# Patient Record
Sex: FEMALE | Race: WHITE | NOT HISPANIC OR LATINO
[De-identification: names, ages, dates, MRNs, and addresses within clinical notes are randomized per-mention and may not be internally consistent; named-entity substitution may affect disease eponyms.]

---

## 2017-04-27 ENCOUNTER — RESULT REVIEW (OUTPATIENT)
Age: 57
End: 2017-04-27

## 2017-06-09 ENCOUNTER — OUTPATIENT (OUTPATIENT)
Dept: OUTPATIENT SERVICES | Facility: HOSPITAL | Age: 57
LOS: 1 days | End: 2017-06-09
Payer: COMMERCIAL

## 2017-06-09 ENCOUNTER — APPOINTMENT (OUTPATIENT)
Dept: MAMMOGRAPHY | Facility: CLINIC | Age: 57
End: 2017-06-09

## 2017-06-09 ENCOUNTER — APPOINTMENT (OUTPATIENT)
Dept: ULTRASOUND IMAGING | Facility: CLINIC | Age: 57
End: 2017-06-09

## 2017-06-09 DIAGNOSIS — Z00.8 ENCOUNTER FOR OTHER GENERAL EXAMINATION: ICD-10-CM

## 2017-06-09 PROCEDURE — 76641 ULTRASOUND BREAST COMPLETE: CPT

## 2017-06-09 PROCEDURE — 77063 BREAST TOMOSYNTHESIS BI: CPT

## 2017-06-09 PROCEDURE — 77067 SCR MAMMO BI INCL CAD: CPT

## 2017-08-16 ENCOUNTER — RESULT REVIEW (OUTPATIENT)
Age: 57
End: 2017-08-16

## 2018-05-29 ENCOUNTER — EMERGENCY (EMERGENCY)
Facility: HOSPITAL | Age: 58
LOS: 0 days | Discharge: HOME | End: 2018-05-29
Attending: EMERGENCY MEDICINE | Admitting: EMERGENCY MEDICINE
Payer: COMMERCIAL

## 2018-05-29 VITALS
HEART RATE: 99 BPM | WEIGHT: 160.06 LBS | OXYGEN SATURATION: 100 % | DIASTOLIC BLOOD PRESSURE: 84 MMHG | SYSTOLIC BLOOD PRESSURE: 137 MMHG | RESPIRATION RATE: 20 BRPM | TEMPERATURE: 98 F
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
## 2018-05-29 DIAGNOSIS — M54.2 CERVICALGIA: ICD-10-CM

## 2018-05-29 DIAGNOSIS — S29.012A STRAIN OF MUSCLE AND TENDON OF BACK WALL OF THORAX, INITIAL ENCOUNTER: ICD-10-CM
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
## 2018-05-29 PROCEDURE — 99283 EMERGENCY DEPT VISIT LOW MDM: CPT

## 2018-05-29 PROCEDURE — 93010 ELECTROCARDIOGRAM REPORT: CPT

## 2018-05-29 RX ORDER — IBUPROFEN 200 MG
1 TABLET ORAL
Qty: 20 | Refills: 0 | OUTPATIENT
Start: 2018-05-29 | End: 2018-06-02

## 2018-05-29 RX ORDER — CYCLOBENZAPRINE HYDROCHLORIDE 10 MG/1
10 TABLET, FILM COATED ORAL ONCE
Qty: 0 | Refills: 0 | Status: COMPLETED | OUTPATIENT
Start: 2018-05-29 | End: 2018-05-29

## 2018-05-29 RX ORDER — CYCLOBENZAPRINE HYDROCHLORIDE 10 MG/1
1 TABLET, FILM COATED ORAL
Qty: 21 | Refills: 0 | OUTPATIENT
Start: 2018-05-29 | End: 2018-06-04

## 2018-05-29 RX ORDER — IBUPROFEN 200 MG
400 TABLET ORAL ONCE
Qty: 0 | Refills: 0 | Status: COMPLETED | OUTPATIENT
Start: 2018-05-29 | End: 2018-05-29

## 2018-05-29 RX ADMIN — CYCLOBENZAPRINE HYDROCHLORIDE 10 MILLIGRAM(S): 10 TABLET, FILM COATED ORAL at 19:27

## 2018-05-29 RX ADMIN — Medication 400 MILLIGRAM(S): at 19:27

## 2018-05-29 NOTE — ED ADULT TRIAGE NOTE - CHIEF COMPLAINT QUOTE
pt c/o upper back pain, left arm and neck pain since yesterday, pt states she woke up with the discomfort, denies fall trauma, fever or heavy lifting.

## 2018-05-29 NOTE — ED STATDOCS - PROGRESS NOTE DETAILS
57 yr. old female PMH: presents to ED with  left upper back and neck pain onset yesterday. No fever or chills. No recent trauma. Seen and examined by attending in Intake. Plan: Flexeril and Ibuprofen. Will F/u with results and re evaluate. Froy HANSON

## 2018-05-29 NOTE — ED STATDOCS - OBJECTIVE STATEMENT
56 y/o F presents to ED c/o upper back pain, left arm and neck pain since yesterday, pt states she woke up with the discomfort. Denies fall trauma, fever or heavy lifting.

## 2018-05-29 NOTE — ED STATDOCS - MEDICAL DECISION MAKING DETAILS
Paraspinal muscular tenderness only.  No midline TTP.  No step off or deformity.  Normal ROM of the neck. Neurologic exam normal, no focal deficits.  No concern for cervical fracture or spinal cord injury.  Likely cervical sprain.  Recommend RICE therapy.  F/u with PCP.

## 2018-05-29 NOTE — ED ADULT NURSE NOTE - OBJECTIVE STATEMENT
pt c/o upper back pain, left arm and neck pain since yesterday, pt states she woke up with the discomfort, denies trauma

## 2018-06-11 ENCOUNTER — APPOINTMENT (OUTPATIENT)
Dept: MAMMOGRAPHY | Facility: CLINIC | Age: 58
End: 2018-06-11
Payer: COMMERCIAL

## 2018-06-11 ENCOUNTER — OUTPATIENT (OUTPATIENT)
Dept: OUTPATIENT SERVICES | Facility: HOSPITAL | Age: 58
LOS: 1 days | End: 2018-06-11
Payer: COMMERCIAL

## 2018-06-11 ENCOUNTER — APPOINTMENT (OUTPATIENT)
Dept: ULTRASOUND IMAGING | Facility: CLINIC | Age: 58
End: 2018-06-11
Payer: COMMERCIAL

## 2018-06-11 DIAGNOSIS — Z00.8 ENCOUNTER FOR OTHER GENERAL EXAMINATION: ICD-10-CM

## 2018-06-11 PROCEDURE — 76641 ULTRASOUND BREAST COMPLETE: CPT | Mod: 26,50

## 2018-06-11 PROCEDURE — 76641 ULTRASOUND BREAST COMPLETE: CPT

## 2018-06-11 PROCEDURE — 77067 SCR MAMMO BI INCL CAD: CPT

## 2018-06-11 PROCEDURE — 77067 SCR MAMMO BI INCL CAD: CPT | Mod: 26

## 2018-06-11 PROCEDURE — 77063 BREAST TOMOSYNTHESIS BI: CPT

## 2018-06-11 PROCEDURE — 77063 BREAST TOMOSYNTHESIS BI: CPT | Mod: 26

## 2018-08-11 ENCOUNTER — APPOINTMENT (OUTPATIENT)
Dept: ORTHOPEDIC SURGERY | Facility: CLINIC | Age: 58
End: 2018-08-11
Payer: OTHER MISCELLANEOUS

## 2018-08-11 VITALS
HEIGHT: 60 IN | WEIGHT: 184 LBS | HEART RATE: 64 BPM | SYSTOLIC BLOOD PRESSURE: 134 MMHG | BODY MASS INDEX: 36.12 KG/M2 | DIASTOLIC BLOOD PRESSURE: 81 MMHG

## 2018-08-11 DIAGNOSIS — Z78.9 OTHER SPECIFIED HEALTH STATUS: ICD-10-CM

## 2018-08-11 DIAGNOSIS — S80.02XA CONTUSION OF LEFT KNEE, INITIAL ENCOUNTER: ICD-10-CM

## 2018-08-11 DIAGNOSIS — Z80.9 FAMILY HISTORY OF MALIGNANT NEOPLASM, UNSPECIFIED: ICD-10-CM

## 2018-08-11 PROCEDURE — 99204 OFFICE O/P NEW MOD 45 MIN: CPT

## 2018-08-11 PROCEDURE — 73562 X-RAY EXAM OF KNEE 3: CPT | Mod: LT

## 2018-08-24 ENCOUNTER — APPOINTMENT (OUTPATIENT)
Dept: MRI IMAGING | Facility: CLINIC | Age: 58
End: 2018-08-24
Payer: COMMERCIAL

## 2018-08-24 ENCOUNTER — OUTPATIENT (OUTPATIENT)
Dept: OUTPATIENT SERVICES | Facility: HOSPITAL | Age: 58
LOS: 1 days | End: 2018-08-24
Payer: COMMERCIAL

## 2018-08-24 DIAGNOSIS — Z00.8 ENCOUNTER FOR OTHER GENERAL EXAMINATION: ICD-10-CM

## 2018-08-24 PROCEDURE — 73721 MRI JNT OF LWR EXTRE W/O DYE: CPT

## 2018-08-24 PROCEDURE — 73721 MRI JNT OF LWR EXTRE W/O DYE: CPT | Mod: 26,LT

## 2018-08-31 ENCOUNTER — APPOINTMENT (OUTPATIENT)
Dept: ORTHOPEDIC SURGERY | Facility: CLINIC | Age: 58
End: 2018-08-31
Payer: OTHER MISCELLANEOUS

## 2018-08-31 DIAGNOSIS — S83.242A OTHER TEAR OF MEDIAL MENISCUS, CURRENT INJURY, LEFT KNEE, INITIAL ENCOUNTER: ICD-10-CM

## 2018-08-31 PROCEDURE — 99214 OFFICE O/P EST MOD 30 MIN: CPT

## 2018-11-16 ENCOUNTER — OUTPATIENT (OUTPATIENT)
Dept: OUTPATIENT SERVICES | Facility: HOSPITAL | Age: 58
LOS: 1 days | Discharge: HOME | End: 2018-11-16
Payer: OTHER MISCELLANEOUS

## 2018-11-16 VITALS
HEART RATE: 61 BPM | RESPIRATION RATE: 16 BRPM | TEMPERATURE: 98 F | OXYGEN SATURATION: 98 % | WEIGHT: 186.07 LBS | DIASTOLIC BLOOD PRESSURE: 90 MMHG | HEIGHT: 60 IN | SYSTOLIC BLOOD PRESSURE: 140 MMHG

## 2018-11-16 DIAGNOSIS — Z98.890 OTHER SPECIFIED POSTPROCEDURAL STATES: Chronic | ICD-10-CM

## 2018-11-16 DIAGNOSIS — S83.222D PERIPHERAL TEAR OF MEDIAL MENISCUS, CURRENT INJURY, LEFT KNEE, SUBSEQUENT ENCOUNTER: ICD-10-CM

## 2018-11-16 LAB
ANION GAP SERPL CALC-SCNC: 7 MMOL/L — SIGNIFICANT CHANGE UP (ref 5–17)
BASOPHILS # BLD AUTO: 0.04 K/UL — SIGNIFICANT CHANGE UP (ref 0–0.2)
BASOPHILS NFR BLD AUTO: 0.6 % — SIGNIFICANT CHANGE UP (ref 0–2)
BUN SERPL-MCNC: 16 MG/DL — SIGNIFICANT CHANGE UP (ref 7–23)
CALCIUM SERPL-MCNC: 8.9 MG/DL — SIGNIFICANT CHANGE UP (ref 8.5–10.1)
CHLORIDE SERPL-SCNC: 111 MMOL/L — HIGH (ref 96–108)
CO2 SERPL-SCNC: 24 MMOL/L — SIGNIFICANT CHANGE UP (ref 22–31)
CREAT SERPL-MCNC: 0.77 MG/DL — SIGNIFICANT CHANGE UP (ref 0.5–1.3)
EOSINOPHIL # BLD AUTO: 0.21 K/UL — SIGNIFICANT CHANGE UP (ref 0–0.5)
EOSINOPHIL NFR BLD AUTO: 3.2 % — SIGNIFICANT CHANGE UP (ref 0–6)
GLUCOSE SERPL-MCNC: 103 MG/DL — HIGH (ref 70–99)
HCT VFR BLD CALC: 39.7 % — SIGNIFICANT CHANGE UP (ref 34.5–45)
HGB BLD-MCNC: 13.7 G/DL — SIGNIFICANT CHANGE UP (ref 11.5–15.5)
IMM GRANULOCYTES NFR BLD AUTO: 0.3 % — SIGNIFICANT CHANGE UP (ref 0–1.5)
LYMPHOCYTES # BLD AUTO: 2.58 K/UL — SIGNIFICANT CHANGE UP (ref 1–3.3)
LYMPHOCYTES # BLD AUTO: 39.9 % — SIGNIFICANT CHANGE UP (ref 13–44)
MCHC RBC-ENTMCNC: 31 PG — SIGNIFICANT CHANGE UP (ref 27–34)
MCHC RBC-ENTMCNC: 34.5 GM/DL — SIGNIFICANT CHANGE UP (ref 32–36)
MCV RBC AUTO: 89.8 FL — SIGNIFICANT CHANGE UP (ref 80–100)
MONOCYTES # BLD AUTO: 0.56 K/UL — SIGNIFICANT CHANGE UP (ref 0–0.9)
MONOCYTES NFR BLD AUTO: 8.7 % — SIGNIFICANT CHANGE UP (ref 2–14)
NEUTROPHILS # BLD AUTO: 3.06 K/UL — SIGNIFICANT CHANGE UP (ref 1.8–7.4)
NEUTROPHILS NFR BLD AUTO: 47.3 % — SIGNIFICANT CHANGE UP (ref 43–77)
NRBC # BLD: 0 /100 WBCS — SIGNIFICANT CHANGE UP (ref 0–0)
PLATELET # BLD AUTO: 186 K/UL — SIGNIFICANT CHANGE UP (ref 150–400)
POTASSIUM SERPL-MCNC: 4.4 MMOL/L — SIGNIFICANT CHANGE UP (ref 3.5–5.3)
POTASSIUM SERPL-SCNC: 4.4 MMOL/L — SIGNIFICANT CHANGE UP (ref 3.5–5.3)
RBC # BLD: 4.42 M/UL — SIGNIFICANT CHANGE UP (ref 3.8–5.2)
RBC # FLD: 12.7 % — SIGNIFICANT CHANGE UP (ref 10.3–14.5)
SODIUM SERPL-SCNC: 142 MMOL/L — SIGNIFICANT CHANGE UP (ref 135–145)
WBC # BLD: 6.47 K/UL — SIGNIFICANT CHANGE UP (ref 3.8–10.5)
WBC # FLD AUTO: 6.47 K/UL — SIGNIFICANT CHANGE UP (ref 3.8–10.5)

## 2018-11-16 PROCEDURE — 71046 X-RAY EXAM CHEST 2 VIEWS: CPT | Mod: 26

## 2018-11-16 PROCEDURE — 93010 ELECTROCARDIOGRAM REPORT: CPT

## 2018-11-16 NOTE — H&P PST ADULT - PMH
Carpal tunnel syndrome    NAEL (obstructive sleep apnea)    PAC (premature atrial contraction)    Torn meniscus  left knee Breast cyst, left    Carpal tunnel syndrome    Indigestion    Lipoma    NAEL (obstructive sleep apnea)    PAC (premature atrial contraction)    Torn meniscus  left knee

## 2018-11-16 NOTE — H&P PST ADULT - ASSESSMENT
58 year old female presents to PST for left knee arthroscopy     Plan:  1. PST instructions given ; NPO post midnight   2. Pt instructed to take following meds with sip of water   3. EZ wash instructions given & mupirocin instructions given  4. Medical Evaluation with Dr Schwab 58 year old female presents to PST for left knee arthroscopy     Plan:  1. PST instructions given ; NPO post midnight   2. Labs EKG CXR done as per surgeon request   3. EZ wash instructions given   4. Medical Evaluation with Dr Schwab

## 2018-11-16 NOTE — H&P PST ADULT - HISTORY OF PRESENT ILLNESS
58 year old female presents to [facility] for left knee arthroscopy secondary to medial meniscus tear 58 year old female PMH of NAEL ( no CPAP), PAC; she  presents to PST for left knee arthroscopy secondary to medial meniscus tear

## 2018-11-27 ENCOUNTER — OUTPATIENT (OUTPATIENT)
Dept: OUTPATIENT SERVICES | Facility: HOSPITAL | Age: 58
LOS: 1 days | Discharge: HOME | End: 2018-11-27
Payer: OTHER MISCELLANEOUS

## 2018-11-27 ENCOUNTER — RESULT REVIEW (OUTPATIENT)
Age: 58
End: 2018-11-27

## 2018-11-27 VITALS
OXYGEN SATURATION: 99 % | HEIGHT: 60 IN | RESPIRATION RATE: 16 BRPM | WEIGHT: 186.07 LBS | HEART RATE: 65 BPM | DIASTOLIC BLOOD PRESSURE: 67 MMHG | SYSTOLIC BLOOD PRESSURE: 123 MMHG | TEMPERATURE: 98 F

## 2018-11-27 VITALS
TEMPERATURE: 98 F | OXYGEN SATURATION: 97 % | DIASTOLIC BLOOD PRESSURE: 78 MMHG | HEART RATE: 71 BPM | SYSTOLIC BLOOD PRESSURE: 148 MMHG | RESPIRATION RATE: 16 BRPM

## 2018-11-27 DIAGNOSIS — Z98.890 OTHER SPECIFIED POSTPROCEDURAL STATES: Chronic | ICD-10-CM

## 2018-11-27 PROCEDURE — 88304 TISSUE EXAM BY PATHOLOGIST: CPT | Mod: 26

## 2018-11-27 RX ORDER — OXYCODONE HYDROCHLORIDE 5 MG/1
5 TABLET ORAL ONCE
Qty: 0 | Refills: 0 | Status: DISCONTINUED | OUTPATIENT
Start: 2018-11-27 | End: 2018-11-27

## 2018-11-27 RX ORDER — ASPIRIN/CALCIUM CARB/MAGNESIUM 324 MG
1 TABLET ORAL
Qty: 14 | Refills: 0 | OUTPATIENT
Start: 2018-11-27 | End: 2018-12-10

## 2018-11-27 RX ORDER — ONDANSETRON 8 MG/1
4 TABLET, FILM COATED ORAL ONCE
Qty: 0 | Refills: 0 | Status: DISCONTINUED | OUTPATIENT
Start: 2018-11-27 | End: 2018-11-27

## 2018-11-27 RX ORDER — SODIUM CHLORIDE 9 MG/ML
1000 INJECTION INTRAMUSCULAR; INTRAVENOUS; SUBCUTANEOUS
Qty: 0 | Refills: 0 | Status: DISCONTINUED | OUTPATIENT
Start: 2018-11-27 | End: 2018-11-27

## 2018-11-27 RX ORDER — FENTANYL CITRATE 50 UG/ML
50 INJECTION INTRAVENOUS
Qty: 0 | Refills: 0 | Status: DISCONTINUED | OUTPATIENT
Start: 2018-11-27 | End: 2018-11-27

## 2018-11-27 RX ORDER — RANITIDINE HYDROCHLORIDE 150 MG/1
1 TABLET, FILM COATED ORAL
Qty: 0 | Refills: 0 | COMMUNITY

## 2018-11-27 NOTE — ASU PATIENT PROFILE, ADULT - PSH
History of carpal tunnel release  left  2008  right 2004  History of lumpectomy of left breast  x2 benign

## 2018-11-27 NOTE — ASU PATIENT PROFILE, ADULT - PMH
Breast cyst, left    Carpal tunnel syndrome    Indigestion    Lipoma    NAEL (obstructive sleep apnea)    PAC (premature atrial contraction)    Torn meniscus  left knee

## 2018-11-27 NOTE — BRIEF OPERATIVE NOTE - PROCEDURE
<<-----Click on this checkbox to enter Procedure Knee arthroscopy, left  11/27/2018    Active  [id number]

## 2018-11-27 NOTE — ASU DISCHARGE PLAN (ADULT/PEDIATRIC). - MEDICATION SUMMARY - MEDICATIONS TO TAKE
I will START or STAY ON the medications listed below when I get home from the hospital:    oxyCODONE-acetaminophen 5 mg-325 mg oral tablet  -- 1 tab(s) by mouth every 4 hours, As Needed for pain MDD:6  -- Caution federal law prohibits the transfer of this drug to any person other  than the person for whom it was prescribed.  May cause drowsiness.  Alcohol may intensify this effect.  Use care when operating dangerous machinery.  This prescription cannot be refilled.  This product contains acetaminophen.  Do not use  with any other product containing acetaminophen to prevent possible liver damage.  Using more of this medication than prescribed may cause serious breathing problems.    -- Indication: For Pain    aspirin 325 mg oral tablet  -- 1 tab(s) by mouth once a day for DVT prophylaxis  -- Take with food or milk.    -- Indication: For blood clot prevention    Zantac 75 oral tablet  -- 1 tab(s) by mouth 2 times a day, As Needed  -- Indication: For PRior home med

## 2018-11-27 NOTE — ASU DISCHARGE PLAN (ADULT/PEDIATRIC). - NOTIFY
Numbness, color, or temperature change to extremity/Pain not relieved by Medications/Fever greater than 101/Numbness, tingling/Bleeding that does not stop

## 2018-11-27 NOTE — ASU DISCHARGE PLAN (ADULT/PEDIATRIC). - SPECIAL INSTRUCTIONS
1) Ice is your friend for the next 2 weeks.  Your knee will swell over the next 48hours and you can expect pain to get a bit worse. Ice your Knee plenty, continuously if possible. Fill up a plastic bag, then wrap it in a towel or pillow case.     2) Elevate your leg above your heart with about 3 pillows when you are in bed or chair. Otherwise you should be up and about as much as possible. No sitting around. The more you move the better you will do. But you can rest too when you need it. Weight bearing as tolerated.    3) Expect some bloody drainage. This is usually  from the leftover fluid put inside you knee during surgery. It is normal. It may even soak through the gauze and ACE bandage and look bloody. Not to worry as this is mostly Saline fluid coming out of your knee leftover from surgery. Even a drop of blood can make it look very bloody. Simply place another Gauze and another ACE over it and wrap it snug but not too tight. If it bleeds through the second bandage again, you can call.    4) Remove bandage in 48hrs and place waterproof band aides. Can shower in 48 hours. No bath. Pat your incisions dry. No creams, no lotions.    5) Only reason to worry would be if pain got so severe that you cannot feel or wiggle your toes, or if your foot is cold. In this case you need to call and come to the ER. But as  long as you can feel and wiggle your toes, you should be fine.    6) Make sure you call the office to schedule a follow up appointment to be seen in 10-14 days. Your Sutures will be removed at that point.    7) A pain Rx is in the chart; fill it on your way home. OR was sent electronically to your pharmacy, pick it up on the way home.

## 2018-11-30 LAB — SURGICAL PATHOLOGY FINAL REPORT - CH: SIGNIFICANT CHANGE UP

## 2018-12-02 DIAGNOSIS — M23.222 DERANGEMENT OF POSTERIOR HORN OF MEDIAL MENISCUS DUE TO OLD TEAR OR INJURY, LEFT KNEE: ICD-10-CM

## 2018-12-02 DIAGNOSIS — M23.204 DERANGEMENT OF UNSPECIFIED MEDIAL MENISCUS DUE TO OLD TEAR OR INJURY, LEFT KNEE: ICD-10-CM

## 2018-12-02 DIAGNOSIS — I49.1 ATRIAL PREMATURE DEPOLARIZATION: ICD-10-CM

## 2018-12-02 DIAGNOSIS — G47.33 OBSTRUCTIVE SLEEP APNEA (ADULT) (PEDIATRIC): ICD-10-CM

## 2018-12-02 DIAGNOSIS — K21.9 GASTRO-ESOPHAGEAL REFLUX DISEASE WITHOUT ESOPHAGITIS: ICD-10-CM

## 2018-12-02 DIAGNOSIS — K30 FUNCTIONAL DYSPEPSIA: ICD-10-CM

## 2018-12-02 DIAGNOSIS — M94.262 CHONDROMALACIA, LEFT KNEE: ICD-10-CM

## 2019-06-10 PROBLEM — S83.209A UNSPECIFIED TEAR OF UNSPECIFIED MENISCUS, CURRENT INJURY, UNSPECIFIED KNEE, INITIAL ENCOUNTER: Chronic | Status: ACTIVE | Noted: 2018-11-16

## 2019-06-10 PROBLEM — K30 FUNCTIONAL DYSPEPSIA: Chronic | Status: ACTIVE | Noted: 2018-11-16

## 2019-06-10 PROBLEM — D17.9 BENIGN LIPOMATOUS NEOPLASM, UNSPECIFIED: Chronic | Status: ACTIVE | Noted: 2018-11-16

## 2019-06-10 PROBLEM — N60.02 SOLITARY CYST OF LEFT BREAST: Chronic | Status: ACTIVE | Noted: 2018-11-16

## 2019-06-10 PROBLEM — I49.1 ATRIAL PREMATURE DEPOLARIZATION: Chronic | Status: ACTIVE | Noted: 2018-11-16

## 2019-06-10 PROBLEM — G56.00 CARPAL TUNNEL SYNDROME, UNSPECIFIED UPPER LIMB: Chronic | Status: ACTIVE | Noted: 2018-11-16

## 2019-06-10 PROBLEM — G47.33 OBSTRUCTIVE SLEEP APNEA (ADULT) (PEDIATRIC): Chronic | Status: ACTIVE | Noted: 2018-11-16

## 2019-06-20 ENCOUNTER — APPOINTMENT (OUTPATIENT)
Dept: ULTRASOUND IMAGING | Facility: CLINIC | Age: 59
End: 2019-06-20
Payer: COMMERCIAL

## 2019-06-20 ENCOUNTER — OUTPATIENT (OUTPATIENT)
Dept: OUTPATIENT SERVICES | Facility: HOSPITAL | Age: 59
LOS: 1 days | End: 2019-06-20
Payer: COMMERCIAL

## 2019-06-20 ENCOUNTER — APPOINTMENT (OUTPATIENT)
Dept: MAMMOGRAPHY | Facility: CLINIC | Age: 59
End: 2019-06-20
Payer: COMMERCIAL

## 2019-06-20 DIAGNOSIS — Z98.890 OTHER SPECIFIED POSTPROCEDURAL STATES: Chronic | ICD-10-CM

## 2019-06-20 DIAGNOSIS — Z00.8 ENCOUNTER FOR OTHER GENERAL EXAMINATION: ICD-10-CM

## 2019-06-20 PROCEDURE — 76641 ULTRASOUND BREAST COMPLETE: CPT | Mod: 26,50

## 2019-06-20 PROCEDURE — 77063 BREAST TOMOSYNTHESIS BI: CPT | Mod: 26

## 2019-06-20 PROCEDURE — 77067 SCR MAMMO BI INCL CAD: CPT | Mod: 26

## 2019-06-20 PROCEDURE — 76641 ULTRASOUND BREAST COMPLETE: CPT

## 2019-06-20 PROCEDURE — 77067 SCR MAMMO BI INCL CAD: CPT

## 2019-06-20 PROCEDURE — 77063 BREAST TOMOSYNTHESIS BI: CPT

## 2019-07-25 ENCOUNTER — APPOINTMENT (OUTPATIENT)
Dept: GASTROENTEROLOGY | Facility: CLINIC | Age: 59
End: 2019-07-25

## 2020-03-07 ENCOUNTER — TRANSCRIPTION ENCOUNTER (OUTPATIENT)
Age: 60
End: 2020-03-07

## 2020-04-26 ENCOUNTER — MESSAGE (OUTPATIENT)
Age: 60
End: 2020-04-26

## 2020-05-03 LAB
SARS-COV-2 IGG SERPL IA-ACNC: 0.2 RATIO
SARS-COV-2 IGG SERPL QL IA: NEGATIVE

## 2020-05-29 ENCOUNTER — EMERGENCY (EMERGENCY)
Facility: HOSPITAL | Age: 60
LOS: 0 days | Discharge: HOME | End: 2020-05-29
Payer: COMMERCIAL

## 2020-05-29 VITALS
SYSTOLIC BLOOD PRESSURE: 155 MMHG | HEART RATE: 67 BPM | RESPIRATION RATE: 18 BRPM | TEMPERATURE: 97 F | DIASTOLIC BLOOD PRESSURE: 63 MMHG | OXYGEN SATURATION: 98 %

## 2020-05-29 DIAGNOSIS — Z79.82 LONG TERM (CURRENT) USE OF ASPIRIN: ICD-10-CM

## 2020-05-29 DIAGNOSIS — Z98.890 OTHER SPECIFIED POSTPROCEDURAL STATES: Chronic | ICD-10-CM

## 2020-05-29 DIAGNOSIS — R05 COUGH: ICD-10-CM

## 2020-05-29 DIAGNOSIS — Z20.828 CONTACT WITH AND (SUSPECTED) EXPOSURE TO OTHER VIRAL COMMUNICABLE DISEASES: ICD-10-CM

## 2020-05-29 DIAGNOSIS — M79.10 MYALGIA, UNSPECIFIED SITE: ICD-10-CM

## 2020-05-29 DIAGNOSIS — Z79.899 OTHER LONG TERM (CURRENT) DRUG THERAPY: ICD-10-CM

## 2020-05-29 DIAGNOSIS — R53.83 OTHER FATIGUE: ICD-10-CM

## 2020-05-29 DIAGNOSIS — R51 HEADACHE: ICD-10-CM

## 2020-05-29 DIAGNOSIS — B34.9 VIRAL INFECTION, UNSPECIFIED: ICD-10-CM

## 2020-05-29 LAB — SARS-COV-2 RNA SPEC QL NAA+PROBE: SIGNIFICANT CHANGE UP

## 2020-05-29 PROCEDURE — 99283 EMERGENCY DEPT VISIT LOW MDM: CPT

## 2020-05-29 PROCEDURE — U0003: CPT

## 2020-05-29 NOTE — ED STATDOCS - NS ED ROS FT
ROS: Constitutional- -fever, -chills.  Respiratory- +cough, -SOB  Cardiac- no chest pain, no palpitations, ENT- -rhinorrhea, no sore throat, no congestion.  Abdomen- No nausea, no vomiting, no diarrhea.  Urinary- no dysuria, no urgency, no frequency.  Skin- No rashes

## 2020-05-29 NOTE — ED ADULT TRIAGE NOTE - CHIEF COMPLAINT QUOTE
Patient presents in ED with flu-like symptoms. Patient presents in ED with flu-like symptoms.   Patient is an employee of [employer]. Patient has given verbal consent for employee health to call them with results.

## 2020-05-29 NOTE — ED STATDOCS - OBJECTIVE STATEMENT
Pt with no PMH presents to ED with cough, fatigue, body aches, HA x 3-4 days. Pt recently exposed to COVID-19. Pt is RN at [employer]. Pt here for testing.

## 2020-08-05 ENCOUNTER — APPOINTMENT (OUTPATIENT)
Dept: MAMMOGRAPHY | Facility: CLINIC | Age: 60
End: 2020-08-05
Payer: COMMERCIAL

## 2020-08-05 ENCOUNTER — OUTPATIENT (OUTPATIENT)
Dept: OUTPATIENT SERVICES | Facility: HOSPITAL | Age: 60
LOS: 1 days | End: 2020-08-05
Payer: COMMERCIAL

## 2020-08-05 ENCOUNTER — APPOINTMENT (OUTPATIENT)
Dept: ULTRASOUND IMAGING | Facility: CLINIC | Age: 60
End: 2020-08-05
Payer: COMMERCIAL

## 2020-08-05 DIAGNOSIS — Z98.890 OTHER SPECIFIED POSTPROCEDURAL STATES: Chronic | ICD-10-CM

## 2020-08-05 DIAGNOSIS — Z00.8 ENCOUNTER FOR OTHER GENERAL EXAMINATION: ICD-10-CM

## 2020-08-05 PROCEDURE — 76641 ULTRASOUND BREAST COMPLETE: CPT | Mod: 26,50

## 2020-08-05 PROCEDURE — G0279: CPT | Mod: 26

## 2020-08-05 PROCEDURE — 77066 DX MAMMO INCL CAD BI: CPT

## 2020-08-05 PROCEDURE — 76641 ULTRASOUND BREAST COMPLETE: CPT

## 2020-08-05 PROCEDURE — G0279: CPT

## 2020-08-05 PROCEDURE — 77066 DX MAMMO INCL CAD BI: CPT | Mod: 26

## 2020-09-30 ENCOUNTER — APPOINTMENT (OUTPATIENT)
Dept: NEUROLOGY | Facility: CLINIC | Age: 60
End: 2020-09-30
Payer: COMMERCIAL

## 2020-09-30 VITALS
HEIGHT: 60 IN | DIASTOLIC BLOOD PRESSURE: 70 MMHG | SYSTOLIC BLOOD PRESSURE: 114 MMHG | BODY MASS INDEX: 37.11 KG/M2 | HEART RATE: 60 BPM | TEMPERATURE: 98 F | WEIGHT: 189 LBS

## 2020-09-30 DIAGNOSIS — Z82.49 FAMILY HISTORY OF ISCHEMIC HEART DISEASE AND OTHER DISEASES OF THE CIRCULATORY SYSTEM: ICD-10-CM

## 2020-09-30 PROCEDURE — 99204 OFFICE O/P NEW MOD 45 MIN: CPT

## 2020-09-30 NOTE — DISCUSSION/SUMMARY
[FreeTextEntry1] : 60-year-old female with PMHx of mild hyperlipidemia, GERD, presents with complains of intermittent visual phenomena-kaleidoscopic vision in the right eye, right eyelid twitching, occasional dizziness and right periorbital pain and pressure.\par \par # Right blepharospasm\par \par # Migraine aura/ocular migraine\par \par # Cephalgia\par \par - I have recommended MRI of the brain\par - Labs: CRP,ESR\par - As patient is not having pain, and symptoms are only bothersome but do not impair functioning or QOL, I have recommended monitoring closely, maintaining a log, followup in 4 weeks, will decide if  low-dose prophylactic medication may be helpful

## 2020-09-30 NOTE — REASON FOR VISIT
[Consultation] : a consultation visit [FreeTextEntry1] : for evaluation of visual phenomenon, eyelid twitching

## 2020-09-30 NOTE — PHYSICAL EXAM
[General Appearance - Alert] : alert [General Appearance - In No Acute Distress] : in no acute distress [Oriented To Time, Place, And Person] : oriented to person, place, and time [Mood] : the mood was normal [Person] : oriented to person [Place] : oriented to place [Time] : oriented to time [Registration Intact] : recent registration memory intact [Concentration Intact] : normal concentrating ability [Naming Objects] : no difficulty naming common objects [Repeating Phrases] : no difficulty repeating a phrase [Fluency] : fluency intact [Comprehension] : comprehension intact [Past History] : adequate knowledge of personal past history [Cranial Nerves Optic (II)] : visual acuity intact bilaterally,  visual fields full to confrontation, pupils equal round and reactive to light [Cranial Nerves Oculomotor (III)] : extraocular motion intact [Cranial Nerves Trigeminal (V)] : facial sensation intact symmetrically [Cranial Nerves Facial (VII)] : face symmetrical [Cranial Nerves Vestibulocochlear (VIII)] : hearing was intact bilaterally [Cranial Nerves Glossopharyngeal (IX)] : tongue and palate midline [Cranial Nerves Accessory (XI - Cranial And Spinal)] : head turning and shoulder shrug symmetric [Cranial Nerves Hypoglossal (XII)] : there was no tongue deviation with protrusion [Motor Tone] : muscle tone was normal in all four extremities [Motor Strength] : muscle strength was normal in all four extremities [No Muscle Atrophy] : normal bulk in all four extremities [Sensation Tactile Decrease] : light touch was intact [Balance] : balance was intact [2+] : Patella left 2+ [1+] : Ankle jerk left 1+ [PERRL With Normal Accommodation] : pupils were equal in size, round, reactive to light, with normal accommodation [Extraocular Movements] : extraocular movements were intact [Full Visual Field] : full visual field [Hearing Threshold Finger Rub Not [place]] : hearing was normal [Neck Cervical Mass (___cm)] : no neck mass was observed [Auscultation Breath Sounds / Voice Sounds] : lungs were clear to auscultation bilaterally [Heart Rate And Rhythm] : heart rate was normal and rhythm regular [Arterial Pulses Carotid] : carotid pulses were normal with no bruits [Edema] : there was no peripheral edema [Abnormal Walk] : normal gait [] : no rash [FreeTextEntry1] : obese [Past-pointing] : there was no past-pointing [Tremor] : no tremor present [Plantar Reflex Right Only] : normal on the right [Plantar Reflex Left Only] : normal on the left

## 2020-09-30 NOTE — HISTORY OF PRESENT ILLNESS
[FreeTextEntry1] : 60-year-old right-handed female with PMHx of mild hyperlipidemia, GERD, presents today complains of intermittent visual phenomena-kaleidoscopic vision in the right eye, right eyelid twitching, occasional dizziness and right periorbital pain and pressure.\par \par Patient reports that since past few months she has been experiencing intermittent twitching in the right lower eyelid, every few days she is having kaleidoscopic vision mainly in the right eye that lasts a few minutes and resolved, most of the time it's not associated with headache, she does however report that every now and then she has right periorbital / temporal pain and pressure independent of the visual phenomena. Patient also reports mild dizziness off and on and has complaints of tinnitus right more than left, this is however not of recent onset. Patient denies double vision, speech disturbance, vertigo, nausea, vomiting, photophobia, LOC or seizure-like activity. Upon further history, patient denies history of migraines in the past, she does recall having had one episode of kaleidoscopic vision 2 years ago that lasted about 5 minutes. She denies history of migraines in the past.
yes

## 2020-09-30 NOTE — REVIEW OF SYSTEMS
[Numbness] : numbness [Dizziness] : dizziness [As Noted in HPI] : as noted in HPI [Negative] : Heme/Lymph [de-identified] : twitching  [FreeTextEntry4] : tinnitus

## 2020-09-30 NOTE — DATA REVIEWED
[No studies available for review at this time.] : No studies available for review at this time. [de-identified] : 8/12/20: CBC, CMP normal, A1c 5.6, ferritin 233, TSH 2.7, total chol 223, HDL 43, triglycer 142,

## 2020-10-03 NOTE — ED STATDOCS - CHIEF COMPLAINT
Progress Note    Patient: Lucian Boss MRN: [id number]  SSN: [id number]    YOB: 1939  Age: 80 y.o. Sex: male      Admit Date: 9/22/2020    LOS: 10 days     Subjective:     81F, H/o AECHFr and pEF with AICD, PAD, CAD s/p stent with septic shock s.t UTI/ e faecalis on levaquin, sepsis/aechf resolved. Bullous pressure lesion L lower left s/p I&D 9/24. Seen at bedside. Alert and oriented,   No cp  + sob stable, better from admit. O2 bumped to 4l but hypoxia not reported. No resp distress  Main complaint is weakness and loss of appetite. BP trend is improved today, dopamine continues  BG's not well controlled           Review of Systems   Constitutional: Positive for malaise/fatigue/anorexia  HENT: Negative.    Eyes: Negative.    Respiratory: Positive for shortness of breath   Cardiovascular: neg for palpitaions or cp  Gastrointestinal: negative for heartburn, nausea or vomiting  Genitourinary: Negative.    Musculoskeletal: Negative.    Skin: Negative.    Neurological: Negative.    Endo/Heme/Allergies: Negative.    Psychiatric/Behavioral: Negative.      Prior to Admission Medications   Prescriptions Last Dose Informant Patient Reported? Taking? JANUVIA 100 mg tablet 9/22/2020  Yes Yes   Sig: TAKE ONE TABLET BY MOUTH ONE TIME DAILY   atorvastatin (LIPITOR) 40 mg tablet 9/22/2020  Yes Yes   Sig: TAKE ONE TABLET BY MOUTH ONE TIME DAILY AT BEDTIME   cholecalciferol (VITAMIN D3) 1,000 unit tablet 9/22/2020  Yes Yes   Sig: Take  by mouth daily.    clopidogrel (PLAVIX) 75 mg tab 9/22/2020  Yes Yes   Sig: TAKE ONE TABLET BY MOUTH ONE TIME DAILY   cyanocobalamin (VITAMIN B12) 1,000 mcg/mL injection 9/22/2020  Yes Yes   Sig: INJECT 1 ML SUBCUTANEOUSLY ONCE MONTHLY   ezetimibe (ZETIA) 10 mg tablet 9/22/2020  Yes Yes   Sig: TAKE ONE TABLET BY MOUTH AT BEDTIME   ferrous sulfate 325 mg (65 mg iron) tablet 9/22/2020  Yes Yes   Sig: TAKE ONE TABLET BY MOUTH TWICE A DAY   furosemide (LASIX) 40 mg The patient is a 57y year old Female complaining of tablet 2020  Yes Yes   Sig: TAKE ONE TABLET BY MOUTH ONE TIME DAILY   insulin detemir (LEVEMIR FLEXTOUCH U-100 INSULN SC) 2020  Yes Yes   Sig: by SubCUTAneous route. Indications: as needed for BS > 150 once a day then take 10 units   levothyroxine (SYNTHROID) 88 mcg tablet 2020  Yes Yes   Sig: TAKE ONE TABLET BY MOUTH EVERY MORNING   lisinopril (PRINIVIL, ZESTRIL) 5 mg tablet 2020  Yes Yes   Si.5 mg daily   metoprolol succinate (TOPROL-XL) 25 mg XL tablet 2020  Yes Yes   Sig: TAKE ONE TABLET BY MOUTH ONE TIME DAILY   nitroglycerin (NITROSTAT) 0.4 mg SL tablet 2020  Yes Yes   Si.4 mg by SubLINGual route every five (5) minutes as needed for Chest Pain. Up to 3 doses.    pantoprazole (PROTONIX) 40 mg tablet 2020  Yes Yes   Sig: TAKE ONE TABLET BY MOUTH EVERY MORNING   ranolazine ER (RANEXA) 1,000 mg 2020  Yes Yes   Sig: TAKE ONE TABLET BY MOUTH TWICE A DAY      Facility-Administered Medications: None       Current Facility-Administered Medications:     [START ON 10/4/2020] dronabinoL (MARINOL) capsule 5 mg, 5 mg, Oral, ACB&D, Drew Mortimer, Jesus A, MD    metoprolol succinate (TOPROL-XL) XL tablet 12.5 mg, 12.5 mg, Oral, DAILY, Drew Mortimer, Jesus A, MD, 12.5 mg at 10/03/20 1029    lisinopriL (PRINIVIL, ZESTRIL) tablet 2.5 mg, 2.5 mg, Oral, DAILY, Drew Mortimer, Jesus A, MD, 2.5 mg at 10/03/20 1029    midodrine (PROAMATINE) tablet 10 mg, 10 mg, Oral, TID WITH MEALS, Drew Mortimer, Jesus A, MD, 10 mg at 10/03/20 1301    levoFLOXacin (LEVAQUIN) 750 mg in D5W IVPB, 750 mg, IntraVENous, Q48H, Bhaty, Holly Champagne MD, Last Rate: 100 mL/hr at 10/01/20 1322, 750 mg at 10/01/20 1322    heparin porcine injection 5,000 Units, 5,000 Units, SubCUTAneous, Q8H, Holly Ortiz MD, 5,000 Units at 10/03/20 1302    insulin glargine (LANTUS) injection 10 Units, 10 Units, SubCUTAneous, QHS, Eden Lewis MD, 10 Units at 10/02/20 2047    glucose chewable tablet 16 g, 4 Tab, Oral, PRN, Diana, Tiffany Reza MD    glucagon [facility]) injection 1 mg, 1 mg, IntraMUSCular, PRN, Karen Hogan MD    dextrose (D50W) injection syrg 12.5-25 g, 25-50 mL, IntraVENous, PRN, Karen Hogan MD    atorvastatin (LIPITOR) tablet 40 mg, 40 mg, Oral, QHS, Ted Yee MD, 40 mg at 10/02/20 2047    cholecalciferol (VITAMIN D3) (1000 Units /25 mcg) tablet 1 Tab, 1,000 Units, Oral, DAILY, Ted Yee MD, 1 Tab at 10/03/20 1030    clopidogreL (PLAVIX) tablet 75 mg, 75 mg, Oral, DAILY, Ted Yee MD, 75 mg at 10/03/20 1029    ezetimibe (ZETIA) tablet 10 mg, 10 mg, Oral, DAILY, Ted Yee MD, 10 mg at 10/03/20 1030    ferrous sulfate tablet 325 mg, 1 Tab, Oral, DAILY WITH BREAKFAST, Ted Yee MD, 325 mg at 10/03/20 1029    furosemide (LASIX) tablet 40 mg, 40 mg, Oral, DAILY, Ted Yee MD, 40 mg at 10/03/20 1029    SITagliptin (JANUVIA) tablet tab 50 mg, 50 mg, Oral, DAILY, Ted Yee MD, 50 mg at 10/03/20 1317    levothyroxine (SYNTHROID) tablet 88 mcg, 88 mcg, Oral, 6am, Ted Yee MD, 88 mcg at 10/03/20 0701    pantoprazole (PROTONIX) tablet 40 mg, 40 mg, Oral, ACB, Ted Yee MD, 40 mg at 10/03/20 1030    [Held by provider] ranolazine ER (RANEXA) tablet 500 mg, 500 mg, Oral, BID, Ted Yee MD, Stopped at 09/25/20 0900    insulin lispro (HUMALOG) injection, , SubCUTAneous, AC&HS, Ted Yee MD, 5 Units at 10/03/20 1301    glucose chewable tablet 16 g, 4 Tab, Oral, PRN, Ted Yee MD    dextrose (D50W) injection syrg 12.5-25 g, 25-50 mL, IntraVENous, PRN, Ted Yee MD    glucagon (GLUCAGEN) injection 1 mg, 1 mg, IntraMUSCular, PRN, Ted Yee MD    multivitamin (ONE A DAY) tablet 1 Tab, 1 Tab, Oral, DAILY, Ted Yee MD, 1 Tab at 10/03/20 1030    B complex-vitaminC-folic acid (NEPHROCAP) cap, 1 Cap, Oral, DAILY, Candido Lux, Les Wahl MD, 1 Cap at 10/03/20 1030    acetaminophen (TYLENOL) tablet 650 mg, 650 mg, Oral, Q4H PRN, Karin Batista NP, 650 mg at 10/03/20 1052    oxyCODONE IR (ROXICODONE) tablet 5 mg, 5 mg, Oral, Q6H PRN, Epifanio Guzman PA-C, 5 mg at 10/02/20 0336    Objective:     Vitals:    10/03/20 1005 10/03/20 1027 10/03/20 1342 10/03/20 1706   BP:  (!) 130/57  (!) 132/58   Pulse: 70 74  70   Resp:  18  18   Temp:  98.3 °F (36.8 °C)  97.7 °F (36.5 °C)   SpO2:  95% 95% 95%   Weight:       Height:            Intake and Output:  Current Shift: No intake/output data recorded. Last three shifts: No intake/output data recorded. Physical Exam:   General : Appearance:  no respiratory distress noted  HEENT : EOMI, normal oral mucosa, atraumatic normocephalic, Normal ear and nose. Neck : Supple, no JVD, no masses noted, no carotid bruit  Lungs : normal breath sounds.  No wheezing, no rales.  He is splinting on deep breathing. CVS : Rhythm rate regular, S1+, S2+, no murmur or gallop  Abdomen : Soft, nontender, no organomegaly, bowel sounds active  Extremities : No edema noted,  pedal pulses palpable  Musculoskeletal : Fair range of motion, no joint swelling or effusion, muscle tone and power appears normal,   Skin : Moist, warm, skin turgor, no pathological rash  Lymphatic : No cervical lymphadenopathy.   Neurological : Awake, alert, oriented to time place person.  No neurological deficits.    Psychiatric : Mood and affect appears appropriate to the situation.     Lab/Data Review:  Recent Results (from the past 24 hour(s))   GLUCOSE, POC    Collection Time: 10/02/20  8:26 PM   Result Value Ref Range    Glucose (POC) 240 (H) 65 - 100 mg/dL    Performed by Gabby Brito    GLUCOSE, POC    Collection Time: 10/03/20  7:48 AM   Result Value Ref Range    Glucose (POC) 209 (H) 65 - 100 mg/dL    Performed by ANN-MARIE LYLES    METABOLIC PANEL, BASIC    Collection Time: 10/03/20  8:12 AM   Result Value Ref Range    Sodium 133 (L) 136 - 145 mmol/L    Potassium 3.6 3.5 - 5.1 mmol/L    Chloride 98 97 - 108 mmol/L    CO2 28 21 - 32 mmol/L    Anion gap 7 5 - 15 mmol/L    Glucose 187 (H) 65 - 100 mg/dL    BUN 23 (H) 6 - 20 mg/dL    Creatinine 0.91 0.70 - 1.30 mg/dL    BUN/Creatinine ratio 25 (H) 12 - 20      GFR est AA >60 >60 ml/min/1.73m2    GFR est non-AA >60 >60 ml/min/1.73m2    Calcium 9.1 8.5 - 10.1 mg/dL   CBC WITH AUTOMATED DIFF    Collection Time: 10/03/20  8:12 AM   Result Value Ref Range    WBC 7.5 4.1 - 11.1 K/uL    RBC 3.56 (L) 4.10 - 5.70 M/uL    HGB 9.6 (L) 12.1 - 17.0 g/dL    HCT 29.0 (L) 36.6 - 50.3 %    MCV 81.5 80.0 - 99.0 FL    MCH 27.0 26.0 - 34.0 PG    MCHC 33.1 30.0 - 36.5 g/dL    RDW 16.1 (H) 11.5 - 14.5 %    PLATELET 944 (H) 849 - 400 K/uL    MPV 10.5 8.9 - 12.9 FL    NEUTROPHILS 82 (H) 32 - 75 %    LYMPHOCYTES 7 (L) 12 - 49 %    MONOCYTES 8 5 - 13 %    EOSINOPHILS 2 0 - 7 %    BASOPHILS 1 0 - 1 %    IMMATURE GRANULOCYTES 0 0.0 - 0.5 %    ABS. NEUTROPHILS 6.1 1.8 - 8.0 K/UL    ABS. LYMPHOCYTES 0.5 (L) 0.8 - 3.5 K/UL    ABS. MONOCYTES 0.6 0.0 - 1.0 K/UL    ABS. EOSINOPHILS 0.2 0.0 - 0.4 K/UL    ABS. BASOPHILS 0.1 0.0 - 0.1 K/UL    ABS. IMM. GRANS. 0.0 0.00 - 0.04 K/UL    DF AUTOMATED      RBC COMMENTS Anisocytosis  1+        RBC COMMENTS Target Cells  1+       GLUCOSE, POC    Collection Time: 10/03/20 11:08 AM   Result Value Ref Range    Glucose (POC) 255 (H) 65 - 100 mg/dL    Performed by [facility]          Assessment and plan:      --shock: likely cardiogenic and septic. Levophed and dobutamine. Resolved. Off pressor/dobutamine, s/t  E. Faecalis UTI. levaquin 750 daily from 9/27- d7/10     --UTI: ucs=E Faecalis, continue levaquin     --AECHFr and pEF, resolved, cont lasix po, cardio followed. Cont metoprolol/lisinopril     --PAD: atorvastatin, Plavix, , ezetimibe     --CYNDI on CKDIII: cardiorenal potential ATN given the hypotension.   Resolved cyndi, cr to 0.91    --DMII :lantus 10 and SSI. Not well controlled, resume [place], titrate lantus     --CAD s/p stent/CABG : plavix.       -- Hypotension: Metoprolol dec 12.5 mg bid, lisinopril dec 2.5 mg daily, holding ranexa. Will stop dopamine and monitor Monitor. --Anorexia: states started about 1 week prior to return. Appetite remains poor. [employer]- discussed and he is agreable. Ranexa initiated last admit? Stopped 2/2 hypotension nonetheless. May contribute to anorexia. --AstheniaGait impairment: unable to ambulate safely, participate in adl/iadl and cannot safely maneuver walker or cane. He appears cognizant and safe to use wheelchair but weakness would limit use of standard wheelchair and I recommend a lightweight chair. Wc would improve pts participation and would assist caregivers in [facility]. Declines rehab, will need lightweight wheelchair for mobility/safety. Pt states ambulatory prior to admit 8/2020, [place] x 32 days then back inpatient. States progressed to wheelchair at facility but weaker now and not sure can return home like this now. He does not want to return to [facility] but is open to other snf options if available. Will d/w cm. His weakness appears certainly to have progressed again with prolonged hospitalization.        DVT ppx: heparin    DISPO: Pending course, snf rec'd- cn input noted,[facility] dme arranging. He is profoundly weak and now is considering snf though does not want to return to [facility].     Signed By: Roselia Reddy MD     October 3, 2020

## 2020-10-06 LAB
CRP SERPL-MCNC: 0.74 MG/DL
ERYTHROCYTE [SEDIMENTATION RATE] IN BLOOD BY WESTERGREN METHOD: 17 MM/HR

## 2020-10-17 ENCOUNTER — APPOINTMENT (OUTPATIENT)
Dept: MRI IMAGING | Facility: CLINIC | Age: 60
End: 2020-10-17
Payer: COMMERCIAL

## 2020-10-17 ENCOUNTER — RESULT REVIEW (OUTPATIENT)
Age: 60
End: 2020-10-17

## 2020-10-17 ENCOUNTER — OUTPATIENT (OUTPATIENT)
Dept: OUTPATIENT SERVICES | Facility: HOSPITAL | Age: 60
LOS: 1 days | End: 2020-10-17
Payer: COMMERCIAL

## 2020-10-17 DIAGNOSIS — R51.9 HEADACHE, UNSPECIFIED: ICD-10-CM

## 2020-10-17 DIAGNOSIS — Z98.890 OTHER SPECIFIED POSTPROCEDURAL STATES: Chronic | ICD-10-CM

## 2020-10-17 DIAGNOSIS — G43.109 MIGRAINE WITH AURA, NOT INTRACTABLE, WITHOUT STATUS MIGRAINOSUS: ICD-10-CM

## 2020-10-17 DIAGNOSIS — G24.5 BLEPHAROSPASM: ICD-10-CM

## 2020-10-17 PROCEDURE — 70551 MRI BRAIN STEM W/O DYE: CPT

## 2020-10-17 PROCEDURE — 70551 MRI BRAIN STEM W/O DYE: CPT | Mod: 26

## 2020-12-07 ENCOUNTER — APPOINTMENT (OUTPATIENT)
Dept: ORTHOPEDIC SURGERY | Facility: CLINIC | Age: 60
End: 2020-12-07
Payer: COMMERCIAL

## 2020-12-07 PROCEDURE — 99072 ADDL SUPL MATRL&STAF TM PHE: CPT

## 2020-12-07 PROCEDURE — 99214 OFFICE O/P EST MOD 30 MIN: CPT | Mod: 57

## 2020-12-07 PROCEDURE — 26600 TREAT METACARPAL FRACTURE: CPT | Mod: LT

## 2020-12-07 NOTE — PHYSICAL EXAM
[Normal Finger/nose] : finger to nose coordination [Normal] : no peripheral adenopathy appreciated [de-identified] : L hand: \par skin intact, moderate swelling, moderate ecchymosis at the dorsal ulnar aspect, no erythem, no gross deformity\par TTP at the base of the 5th MC, no TTP at the distal radius or snuffbox\par ROM wrist intact, ROM digits slightly limited 2/2 pain and stiffness, tip to palm distance 2 cm.\par NVI distally [de-identified] : nicr [de-identified] : 3 xray views of the left hand are reviewed, there is a nondisplaced fracture at the base of the 5th MC, no other osseous abnormalities.

## 2020-12-07 NOTE — ASSESSMENT
[FreeTextEntry1] : 60-year-old female status post fall yesterday with a nondisplaced fracture at the base of the left fifth metacarpal. I recommend conservative treatment with use of a wrist immobilizer and the loop for comfort.She will avoid heavy lifting and continue to work on range of motion of the digits.Followup weeks for repeat x-rays and evaluation.

## 2020-12-07 NOTE — HISTORY OF PRESENT ILLNESS
[FreeTextEntry1] : Pt presents for evaluation of left hand pain.  She sustained a mechanical fall yesterday and developed paina dn ecchymosis at the ulnar aspect of her left hand.  The pain does not radiate, it is worse with activity and improved with rest.

## 2021-01-05 ENCOUNTER — APPOINTMENT (OUTPATIENT)
Dept: NEUROLOGY | Facility: CLINIC | Age: 61
End: 2021-01-05
Payer: COMMERCIAL

## 2021-01-05 ENCOUNTER — APPOINTMENT (OUTPATIENT)
Dept: ORTHOPEDIC SURGERY | Facility: CLINIC | Age: 61
End: 2021-01-05
Payer: COMMERCIAL

## 2021-01-05 DIAGNOSIS — R51.9 HEADACHE, UNSPECIFIED: ICD-10-CM

## 2021-01-05 PROCEDURE — 73130 X-RAY EXAM OF HAND: CPT | Mod: LT

## 2021-01-05 PROCEDURE — 99214 OFFICE O/P EST MOD 30 MIN: CPT | Mod: 95

## 2021-01-05 PROCEDURE — 99024 POSTOP FOLLOW-UP VISIT: CPT

## 2021-01-05 NOTE — REASON FOR VISIT
[Follow-Up: _____] : a [unfilled] follow-up visit [FreeTextEntry1] : for blepharospasm, ocular migraine, discuss MRI brain and labs

## 2021-01-05 NOTE — DISCUSSION/SUMMARY
[FreeTextEntry1] : 60-year-old female with PMHx of mild hyperlipidemia, GERD, with c/o of intermittent visual phenomena-kaleidoscopic vision in the right eye, right eyelid twitching, occasional dizziness and right periorbital pain and pressure.\par \par # Right > left blepharospasm - improved, MRI brain - unremarkable\par \par # Migraine aura/ocular migraine : < 1 per month\par \par # Cephalgia; ESR 17\par \par - As patient is not having pain, and symptoms are only bothersome but do not impair functioning or QOL, I have recommended monitoring closely, maintaining a log, followup in 12 weeks, will decide if  low-dose prophylactic medication may be helpful

## 2021-01-05 NOTE — DATA REVIEWED
[No studies available for review at this time.] : No studies available for review at this time. [de-identified] : 10/17/20: MRI of the brain, it showed no evidence of acute stroke, mass or hydrocephalus.\par \par  [de-identified] : 10/20/20: Labs  ESR 17, CRP 0.74\par 8/12/20: CBC, CMP normal, A1c 5.6, ferritin 233, TSH 2.7, total chol 223, HDL 43, triglycer 142,

## 2021-01-05 NOTE — PHYSICAL EXAM
[Normal Finger/nose] : finger to nose coordination [Normal] : no peripheral adenopathy appreciated [de-identified] : L hand: \par skin intact, mild swelling, mild ecchymosis at the dorsal ulnar aspect, no erythema, no gross deformity\par TTP at the base of the 5th MC, no TTP at the distal radius or snuffbox\par ROM wrist intact, ROM digits slightly limited 2/2 pain and stiffness, tip to palm distance 0 cm.\par NVI distally [de-identified] : ncir [de-identified] : 3 xray views of the left hand are reviewed, there is a nondisplaced fracture at the base of the 5th MC, no other osseous abnormalities.

## 2021-01-05 NOTE — REVIEW OF SYSTEMS
[Numbness] : numbness [Dizziness] : dizziness [As Noted in HPI] : as noted in HPI [Negative] : Heme/Lymph [de-identified] : twitching  [FreeTextEntry4] : tinnitus

## 2021-01-05 NOTE — ASSESSMENT
[FreeTextEntry1] : 60 year old female 4 weeks status post closed treatment of a left fifth metacarpal base fracture healing well.Her range of motion has improved, I recommend continuation of light activity as tolerated, followup in 4 weeks forx-rays and reevaluation.

## 2021-01-05 NOTE — HISTORY OF PRESENT ILLNESS
[FreeTextEntry1] : 12/7/20: Pt presents for evaluation of left hand pain.  She sustained a mechanical fall yesterday and developed paina dn ecchymosis at the ulnar aspect of her left hand.  The pain does not radiate, it is worse with activity and improved with rest.  \par \par 1/5/21: patient returns today for followup of her left fifth metacarpal base fracture.She has been weaning from the splint and work on range of motion, she does still have pain at the fracture site that is improved since her last visit.

## 2021-01-05 NOTE — PHYSICAL EXAM
[General Appearance - Alert] : alert [General Appearance - In No Acute Distress] : in no acute distress [FreeTextEntry1] : obese [Oriented To Time, Place, And Person] : oriented to person, place, and time [Mood] : the mood was normal [Person] : oriented to person [Place] : oriented to place [Time] : oriented to time [Registration Intact] : recent registration memory intact [Concentration Intact] : normal concentrating ability [Naming Objects] : no difficulty naming common objects [Repeating Phrases] : no difficulty repeating a phrase [Fluency] : fluency intact [Comprehension] : comprehension intact [Past History] : adequate knowledge of personal past history [Cranial Nerves Optic (II)] : visual acuity intact bilaterally,  visual fields full to confrontation, pupils equal round and reactive to light [Cranial Nerves Oculomotor (III)] : extraocular motion intact [Cranial Nerves Facial (VII)] : face symmetrical [Cranial Nerves Vestibulocochlear (VIII)] : hearing was intact bilaterally [Cranial Nerves Accessory (XI - Cranial And Spinal)] : head turning and shoulder shrug symmetric [Cranial Nerves Hypoglossal (XII)] : there was no tongue deviation with protrusion [No Muscle Atrophy] : normal bulk in all four extremities [Past-pointing] : there was no past-pointing [Tremor] : no tremor present [Plantar Reflex Right Only] : normal on the right [Plantar Reflex Left Only] : normal on the left [FreeTextEntry6] : Limited virtual exam

## 2021-02-09 ENCOUNTER — APPOINTMENT (OUTPATIENT)
Dept: ORTHOPEDIC SURGERY | Facility: CLINIC | Age: 61
End: 2021-02-09
Payer: COMMERCIAL

## 2021-02-09 DIAGNOSIS — S62.347A NONDISPLACED FRACTURE OF BASE OF FIFTH METACARPAL BONE, LEFT HAND, INITIAL ENCOUNTER FOR CLOSED FRACTURE: ICD-10-CM

## 2021-02-09 PROCEDURE — 73130 X-RAY EXAM OF HAND: CPT | Mod: LT

## 2021-02-09 PROCEDURE — 99024 POSTOP FOLLOW-UP VISIT: CPT

## 2021-02-09 NOTE — PHYSICAL EXAM
[Normal Finger/nose] : finger to nose coordination [Normal] : Oriented to person, place, and time, insight and judgement were intact and the affect was normal [de-identified] : L hand: \par skin intact, mild swelling, no ecchymosis at the dorsal ulnar aspect, no erythema, no gross deformity\par no TTP at the base of the 5th MC, no TTP at the distal radius or snuffbox\par ROM wrist intact, ROM digits slightly limited 2/2 pain and stiffness. \par NVI distally [de-identified] : nicr [de-identified] : 3 xray views of the left hand are reviewed, there is a nondisplaced fracture at the base of the 5th MC with interval callus formation.  - - -

## 2021-02-09 NOTE — ASSESSMENT
[FreeTextEntry1] : 60 year old female 8 weeks status post closed treatment of a left fifth metacarpal base fracture healing well.\par At this time over the next four weeks she may slowly return to activities as tolerated. She will work on range of motion of the hand and wrist. She may follow up PRN. \par \par

## 2021-02-09 NOTE — REASON FOR VISIT
[Follow-Up Visit] : a follow-up visit for [Hand Pain] : hand pain [FreeTextEntry2] : Left hand pain.

## 2021-02-09 NOTE — HISTORY OF PRESENT ILLNESS
[FreeTextEntry1] : 12/7/20: Pt presents for evaluation of left hand pain.  She sustained a mechanical fall yesterday and developed pain and ecchymosis at the ulnar aspect of her left hand.  The pain does not radiate, it is worse with activity and improved with rest.  \par \par 1/5/21: patient returns today for followup of her left fifth metacarpal base fracture.She has been weaning from the splint and work on range of motion, she does still have pain at the fracture site that is improved since her last visit.\par \par 2/9/21: Patient returns today 2 months status post mechanical fall with left fifth metacarpal base fracture for reevaluation. She notes her pain is improved, along with her range of motion.

## 2021-03-05 ENCOUNTER — OUTPATIENT (OUTPATIENT)
Dept: OUTPATIENT SERVICES | Facility: HOSPITAL | Age: 61
LOS: 1 days | End: 2021-03-05
Payer: COMMERCIAL

## 2021-03-05 DIAGNOSIS — Z98.890 OTHER SPECIFIED POSTPROCEDURAL STATES: Chronic | ICD-10-CM

## 2021-03-05 DIAGNOSIS — Z20.828 CONTACT WITH AND (SUSPECTED) EXPOSURE TO OTHER VIRAL COMMUNICABLE DISEASES: ICD-10-CM

## 2021-03-05 LAB — SARS-COV-2 RNA SPEC QL NAA+PROBE: SIGNIFICANT CHANGE UP

## 2021-03-05 PROCEDURE — C9803: CPT

## 2021-03-05 PROCEDURE — U0005: CPT

## 2021-03-05 PROCEDURE — U0003: CPT

## 2021-03-06 DIAGNOSIS — Z20.828 CONTACT WITH AND (SUSPECTED) EXPOSURE TO OTHER VIRAL COMMUNICABLE DISEASES: ICD-10-CM

## 2021-03-14 ENCOUNTER — OUTPATIENT (OUTPATIENT)
Dept: OUTPATIENT SERVICES | Facility: HOSPITAL | Age: 61
LOS: 1 days | End: 2021-03-14
Payer: COMMERCIAL

## 2021-03-14 DIAGNOSIS — Z20.828 CONTACT WITH AND (SUSPECTED) EXPOSURE TO OTHER VIRAL COMMUNICABLE DISEASES: ICD-10-CM

## 2021-03-14 DIAGNOSIS — Z98.890 OTHER SPECIFIED POSTPROCEDURAL STATES: Chronic | ICD-10-CM

## 2021-03-14 LAB — SARS-COV-2 RNA SPEC QL NAA+PROBE: SIGNIFICANT CHANGE UP

## 2021-03-14 PROCEDURE — C9803: CPT

## 2021-03-14 PROCEDURE — U0005: CPT

## 2021-03-14 PROCEDURE — U0003: CPT

## 2021-03-15 DIAGNOSIS — Z20.828 CONTACT WITH AND (SUSPECTED) EXPOSURE TO OTHER VIRAL COMMUNICABLE DISEASES: ICD-10-CM

## 2021-03-17 ENCOUNTER — OUTPATIENT (OUTPATIENT)
Dept: OUTPATIENT SERVICES | Facility: HOSPITAL | Age: 61
LOS: 1 days | End: 2021-03-17
Payer: COMMERCIAL

## 2021-03-17 DIAGNOSIS — Z98.890 OTHER SPECIFIED POSTPROCEDURAL STATES: Chronic | ICD-10-CM

## 2021-03-17 DIAGNOSIS — Z20.828 CONTACT WITH AND (SUSPECTED) EXPOSURE TO OTHER VIRAL COMMUNICABLE DISEASES: ICD-10-CM

## 2021-03-17 LAB — SARS-COV-2 RNA SPEC QL NAA+PROBE: SIGNIFICANT CHANGE UP

## 2021-03-17 PROCEDURE — U0003: CPT

## 2021-03-17 PROCEDURE — U0005: CPT

## 2021-03-17 PROCEDURE — C9803: CPT

## 2021-03-18 DIAGNOSIS — Z20.828 CONTACT WITH AND (SUSPECTED) EXPOSURE TO OTHER VIRAL COMMUNICABLE DISEASES: ICD-10-CM

## 2021-04-07 ENCOUNTER — APPOINTMENT (OUTPATIENT)
Dept: NEUROLOGY | Facility: CLINIC | Age: 61
End: 2021-04-07

## 2021-06-02 DIAGNOSIS — Z12.11 ENCOUNTER FOR SCREENING FOR MALIGNANT NEOPLASM OF COLON: ICD-10-CM

## 2021-06-02 DIAGNOSIS — Z63.5 DISRUPTION OF FAMILY BY SEPARATION AND DIVORCE: ICD-10-CM

## 2021-06-02 DIAGNOSIS — Z82.49 FAMILY HISTORY OF ISCHEMIC HEART DISEASE AND OTHER DISEASES OF THE CIRCULATORY SYSTEM: ICD-10-CM

## 2021-06-02 DIAGNOSIS — Z86.010 PERSONAL HISTORY OF COLONIC POLYPS: ICD-10-CM

## 2021-06-02 SDOH — SOCIAL STABILITY - SOCIAL INSECURITY: DISRUPTION OF FAMILY BY SEPARATION AND DIVORCE: Z63.5

## 2021-06-09 ENCOUNTER — NON-APPOINTMENT (OUTPATIENT)
Age: 61
End: 2021-06-09

## 2021-06-09 ENCOUNTER — APPOINTMENT (OUTPATIENT)
Dept: DERMATOLOGY | Facility: CLINIC | Age: 61
End: 2021-06-09
Payer: COMMERCIAL

## 2021-06-09 DIAGNOSIS — L72.0 EPIDERMAL CYST: ICD-10-CM

## 2021-06-09 DIAGNOSIS — L72.3 SEBACEOUS CYST: ICD-10-CM

## 2021-06-09 PROCEDURE — 99203 OFFICE O/P NEW LOW 30 MIN: CPT

## 2021-06-09 PROCEDURE — 99072 ADDL SUPL MATRL&STAF TM PHE: CPT

## 2021-06-09 NOTE — ASSESSMENT
[FreeTextEntry1] : A complete skin examination was performed.  There is no evidence of skin cancer.  We discussed the importance of photoprotection, including the use of hats, protective clothing and sunscreens with an SPF of at least 30.  Sun avoidance was also discussed.  The ABCDE's of melanoma was discussed.  Regular annual skin exams recommended.\par \par Rosacea\par Education - denies flares recently.\par Recommend photoprotection and oil free products.\par \par EIC's - benign.\par Irritated EIC of the left medial labia - benign.  Removal either by GYN, gynecologic surgeon or here.  Discussed at length with patient.  She will discuss further with GYN at upcoming annual examination.

## 2021-06-09 NOTE — HISTORY OF PRESENT ILLNESS
[FreeTextEntry1] : Patient presents for skin examination. [de-identified] : Notes lesions of the right axilla and the vulvar regions.  The latter present since last years GYN appt., at least.  Denies changes in size.  On left site, with some irritation, intermittently.

## 2021-06-09 NOTE — PHYSICAL EXAM
[Alert] : alert [Oriented x 3] : ~L oriented x 3 [Well Nourished] : well nourished [Full Body Skin Exam Performed] : performed [FreeTextEntry3] : A full skin exam was performed including the scalp, face, neck, chest, abdomen, back, buttocks, upper extremities and lower extremities and the genitalia.  The patient declined examination of the breasts.  \par The exam did show the following benign growths:\par [place] pigmented nevi.\par Seborrheic keratoses.\par Lentigines.\par EIC of the right axilla - 3-4 mm in diameter.\par Few skin tags present in the right axilla.\par \par erythema, bilateral malar region.\par \par cystic papules, small on the right labia, but cystic nodule, approx. 2 cm of the left medial labia.

## 2021-08-08 PROBLEM — Z82.49 FAMILY HISTORY OF HYPERTENSION: Status: ACTIVE | Noted: 2021-08-08

## 2021-08-08 PROBLEM — Z83.49 FAMILY HISTORY OF HEMOCHROMATOSIS: Status: ACTIVE | Noted: 2021-08-08

## 2021-08-08 PROBLEM — R00.2 PALPITATIONS: Status: ACTIVE | Noted: 2021-08-08

## 2021-08-08 PROBLEM — Z83.49 FAMILY HISTORY OF HYPOTHYROIDISM: Status: ACTIVE | Noted: 2021-08-08

## 2021-08-08 PROBLEM — Z80.52 FAMILY HISTORY OF MALIGNANT NEOPLASM OF URINARY BLADDER: Status: ACTIVE | Noted: 2021-08-08

## 2021-08-08 RX ORDER — ALPRAZOLAM 0.25 MG/1
0.25 TABLET ORAL
Qty: 2 | Refills: 0 | Status: DISCONTINUED | COMMUNITY
Start: 2020-10-12 | End: 2021-08-08

## 2021-08-09 ENCOUNTER — OUTPATIENT (OUTPATIENT)
Dept: OUTPATIENT SERVICES | Facility: HOSPITAL | Age: 61
LOS: 1 days | End: 2021-08-09
Payer: COMMERCIAL

## 2021-08-09 ENCOUNTER — APPOINTMENT (OUTPATIENT)
Dept: ULTRASOUND IMAGING | Facility: CLINIC | Age: 61
End: 2021-08-09
Payer: COMMERCIAL

## 2021-08-09 ENCOUNTER — APPOINTMENT (OUTPATIENT)
Dept: MAMMOGRAPHY | Facility: CLINIC | Age: 61
End: 2021-08-09
Payer: COMMERCIAL

## 2021-08-09 DIAGNOSIS — Z98.890 OTHER SPECIFIED POSTPROCEDURAL STATES: Chronic | ICD-10-CM

## 2021-08-09 DIAGNOSIS — Z12.31 ENCOUNTER FOR SCREENING MAMMOGRAM FOR MALIGNANT NEOPLASM OF BREAST: ICD-10-CM

## 2021-08-09 DIAGNOSIS — N64.59 OTHER SIGNS AND SYMPTOMS IN BREAST: ICD-10-CM

## 2021-08-09 DIAGNOSIS — R92.2 INCONCLUSIVE MAMMOGRAM: ICD-10-CM

## 2021-08-09 DIAGNOSIS — Z00.8 ENCOUNTER FOR OTHER GENERAL EXAMINATION: ICD-10-CM

## 2021-08-09 PROCEDURE — 77063 BREAST TOMOSYNTHESIS BI: CPT

## 2021-08-09 PROCEDURE — 77063 BREAST TOMOSYNTHESIS BI: CPT | Mod: 26

## 2021-08-09 PROCEDURE — 76641 ULTRASOUND BREAST COMPLETE: CPT | Mod: 26,50

## 2021-08-09 PROCEDURE — 77067 SCR MAMMO BI INCL CAD: CPT

## 2021-08-09 PROCEDURE — 76641 ULTRASOUND BREAST COMPLETE: CPT

## 2021-08-09 PROCEDURE — 77067 SCR MAMMO BI INCL CAD: CPT | Mod: 26

## 2021-08-13 ENCOUNTER — APPOINTMENT (OUTPATIENT)
Dept: FAMILY MEDICINE | Facility: CLINIC | Age: 61
End: 2021-08-13
Payer: COMMERCIAL

## 2021-08-13 VITALS
HEART RATE: 65 BPM | BODY MASS INDEX: 30.12 KG/M2 | DIASTOLIC BLOOD PRESSURE: 74 MMHG | OXYGEN SATURATION: 98 % | SYSTOLIC BLOOD PRESSURE: 116 MMHG | TEMPERATURE: 97 F | HEIGHT: 63 IN | WEIGHT: 170 LBS

## 2021-08-13 DIAGNOSIS — R00.2 PALPITATIONS: ICD-10-CM

## 2021-08-13 DIAGNOSIS — Z82.49 FAMILY HISTORY OF ISCHEMIC HEART DISEASE AND OTHER DISEASES OF THE CIRCULATORY SYSTEM: ICD-10-CM

## 2021-08-13 DIAGNOSIS — Z80.52 FAMILY HISTORY OF MALIGNANT NEOPLASM OF BLADDER: ICD-10-CM

## 2021-08-13 DIAGNOSIS — Z92.89 PERSONAL HISTORY OF OTHER MEDICAL TREATMENT: ICD-10-CM

## 2021-08-13 DIAGNOSIS — Z83.49 FAMILY HISTORY OF OTHER ENDOCRINE, NUTRITIONAL AND METABOLIC DISEASES: ICD-10-CM

## 2021-08-13 PROCEDURE — 99396 PREV VISIT EST AGE 40-64: CPT | Mod: 25

## 2021-08-13 PROCEDURE — 36415 COLL VENOUS BLD VENIPUNCTURE: CPT

## 2021-08-13 RX ORDER — ESOMEPRAZOLE MAGNESIUM 20 MG/1
20 CAPSULE, DELAYED RELEASE ORAL DAILY
Refills: 0 | Status: DISCONTINUED | COMMUNITY
Start: 2021-06-02 | End: 2021-08-13

## 2021-08-13 NOTE — PHYSICAL EXAM
[No Acute Distress] : no acute distress [Well Developed] : well developed [Well-Appearing] : well-appearing [Normal Sclera/Conjunctiva] : normal sclera/conjunctiva [EOMI] : extraocular movements intact [No JVD] : no jugular venous distention [No Lymphadenopathy] : no lymphadenopathy [Supple] : supple [Thyroid Normal, No Nodules] : the thyroid was normal and there were no nodules present [No Respiratory Distress] : no respiratory distress  [No Accessory Muscle Use] : no accessory muscle use [Normal Rate] : normal rate  [Clear to Auscultation] : lungs were clear to auscultation bilaterally [Regular Rhythm] : with a regular rhythm [Normal S1, S2] : normal S1 and S2 [No Varicosities] : no varicosities [Pedal Pulses Present] : the pedal pulses are present [No Edema] : there was no peripheral edema [No Extremity Clubbing/Cyanosis] : no extremity clubbing/cyanosis [Soft] : abdomen soft [Non Tender] : non-tender [Non-distended] : non-distended [No Masses] : no abdominal mass palpated [No HSM] : no HSM [Normal Bowel Sounds] : normal bowel sounds [Normal Posterior Cervical Nodes] : no posterior cervical lymphadenopathy [Normal Anterior Cervical Nodes] : no anterior cervical lymphadenopathy [No Joint Swelling] : no joint swelling [Grossly Normal Strength/Tone] : grossly normal strength/tone [No Rash] : no rash [Coordination Grossly Intact] : coordination grossly intact [No Focal Deficits] : no focal deficits [Normal Gait] : normal gait [Normal Affect] : the affect was normal [Normal Insight/Judgement] : insight and judgment were intact [de-identified] : overweight but visibly slimmer since visit in 2020 (was 197# on our scale at that time and is now 170#) [de-identified] : 1/6 soft systolic murmur heard LUSB with ?radiation to L neck (vs soft L carotid bruit); no bruit heard R side [de-identified] : see above

## 2021-08-13 NOTE — HISTORY OF PRESENT ILLNESS
[de-identified] : Her last PE was 8/2020\par \par Her last tetanus shot was unknown but she thinks is within past 10 yrs\par Shingrix 10/2020 and second dose was delayed due to had to get COVID vacc series\par Had Pfizer COVID vaccines. \par \par Her diet is clean/healthful overall. Being mindful of portions and choices and has lost 30# on her scale. Lots of veggies and lean proteins and little sugar/WF carbs\par Exercises regularly (walking) \par \par Her last colonoscopy was 2018 polyp, rpt due 5 yrs (Dr. Upton)\par Her last mammogram was 8/2021 ([facility])\par Had DEXA 6/2021 improved osteopenia\par Her last gyn exam was 6/2021\par \par Also here to f/u high chol, IFG, GERD, remote h/o bladder cancer. \par \par She had eval with Dr. Sykes recently and she heard a carotid bruit. She had carotid US and Echo and stress test and Holter and has appt next week to review results. She needs lipids rechecked today as well to see if improved/normalized with her clean eating/exercise \par \par F/U GERD/heartburn. Was able to d/c PPI med and change to H2 blocker over past year and only uses H2 blocker when needed and not needed daily. Weight loss has helped improve GERD also. \par \par She had bladder cancer dx in her 30s when was referred by gyn to urol due to mild bladder tenderness noted on routine gyn exam. Urol eval showed very early stage bladder cancer that was able to be completely excised during cystoscopy and no adjuvant therapy was needed. She had f/u cystoscopies for years and always wnl. Has not had urol f/u in 5 yrs or so at this point as prior urologist in [facility] has retired. 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

## 2021-08-13 NOTE — PLAN
[FreeTextEntry1] : Reviewed age-appropriate preventive screening tests with patient. UTD on gyn, mammo, colonoscopy and DEXA.\par \par Revd/recommended Shingrix #2 in near future (but does not need to repeat dose #1) and if >=10 yrs since last Tdap (she will try to check date of last booster) she should get this also. She will RTO when ready to get these.\par \par Cont clean eating and regular exercise/staying as physically active as possible.\par \par Recheck labs and we disc that if LDL is improved with her improvements in eating/exercise then no indication to start statin meds as recent cardiac testing is all normal. If LDL is still elevated despite her hard work then I suspect her hyperlipidemia is genetically driven and in that case she should consider starting statin med. Will cc labs to Clayton Sykes and she can also d/w her and get input from her next week at visit. \par \par Reviewed importance of good self care (eg meditation, yoga, adequate rest, regular exercise, magnesium, clean eating etc).\par \par Revd lifestyle/envtal measures for GERD, ok to cont H2 blocker med as neededand great job signif decreasing use of meds over past year (see HPI)\par \par She plans to sched urol f/u in near future as is a little overdue for cysto and we will check UA w/ micro today (and if +blood then will do urine cytology -- she will RTO to give sample-- but if UA is wnl will defer on cytology)\par \par Next PE in 1 yr.

## 2021-08-13 NOTE — ASSESSMENT
[FreeTextEntry1] : ADRIANNA PAK is a 60 year old female here for a physical exam and f/u on above medical issues. \par \par

## 2021-08-14 LAB
ALBUMIN SERPL ELPH-MCNC: 4.4 G/DL
ALP BLD-CCNC: 51 U/L
ALT SERPL-CCNC: 15 U/L
ANION GAP SERPL CALC-SCNC: 9 MMOL/L
APPEARANCE: CLEAR
AST SERPL-CCNC: 18 U/L
BACTERIA: NEGATIVE
BILIRUB SERPL-MCNC: 0.2 MG/DL
BILIRUBIN URINE: NEGATIVE
BLOOD URINE: NEGATIVE
BUN SERPL-MCNC: 17 MG/DL
CALCIUM SERPL-MCNC: 9.5 MG/DL
CHLORIDE SERPL-SCNC: 108 MMOL/L
CHOLEST SERPL-MCNC: 199 MG/DL
CO2 SERPL-SCNC: 28 MMOL/L
COLOR: COLORLESS
CREAT SERPL-MCNC: 0.82 MG/DL
ESTIMATED AVERAGE GLUCOSE: 108 MG/DL
GLUCOSE QUALITATIVE U: NEGATIVE
GLUCOSE SERPL-MCNC: 101 MG/DL
HBA1C MFR BLD HPLC: 5.4 %
HDLC SERPL-MCNC: 54 MG/DL
HYALINE CASTS: 0 /LPF
KETONES URINE: NEGATIVE
LDLC SERPL CALC-MCNC: 133 MG/DL
LEUKOCYTE ESTERASE URINE: NEGATIVE
MICROSCOPIC-UA: NORMAL
NITRITE URINE: NEGATIVE
NONHDLC SERPL-MCNC: 145 MG/DL
PH URINE: 6.5
POTASSIUM SERPL-SCNC: 5.1 MMOL/L
PROT SERPL-MCNC: 6.7 G/DL
PROTEIN URINE: NEGATIVE
RED BLOOD CELLS URINE: 0 /HPF
SODIUM SERPL-SCNC: 146 MMOL/L
SPECIFIC GRAVITY URINE: 1.01
SQUAMOUS EPITHELIAL CELLS: 1 /HPF
TRIGL SERPL-MCNC: 62 MG/DL
TSH SERPL-ACNC: 2.38 UIU/ML
UROBILINOGEN URINE: NORMAL
WHITE BLOOD CELLS URINE: 2 /HPF

## 2021-08-18 ENCOUNTER — NON-APPOINTMENT (OUTPATIENT)
Age: 61
End: 2021-08-18

## 2021-10-14 ENCOUNTER — APPOINTMENT (OUTPATIENT)
Dept: ORTHOPEDIC SURGERY | Facility: CLINIC | Age: 61
End: 2021-10-14
Payer: COMMERCIAL

## 2021-10-14 VITALS
HEIGHT: 60 IN | DIASTOLIC BLOOD PRESSURE: 78 MMHG | SYSTOLIC BLOOD PRESSURE: 146 MMHG | WEIGHT: 166 LBS | BODY MASS INDEX: 32.59 KG/M2 | HEART RATE: 65 BPM
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
## 2021-10-14 PROCEDURE — 99213 OFFICE O/P EST LOW 20 MIN: CPT

## 2021-10-14 PROCEDURE — 73560 X-RAY EXAM OF KNEE 1 OR 2: CPT | Mod: RT

## 2021-10-18 NOTE — PHYSICAL EXAM
[de-identified] : Left Knee: \par Range of Motion in Degrees	\par 	                  Claimant:	Normal:	\par Flexion Active	  135 	                135-degrees	\par Flexion Passive	  135	                135-degrees	\par Extension Active	  0-5	                0-5-degrees	\par Extension Passive	  0-5	                0-5-degrees	\par \par No weakness to flexion/extension.  No evidence of instability in the AP plane or varus or valgus stress.  Negative  Lachman.  Negative pivot shift.  Negative anterior drawer test.  Negative posterior drawer test.  Negative Patricia.  Negative Apley grind.  No medial or lateral joint line tenderness.  No tenderness over the medial and lateral facet of the patella.  No patellofemoral crepitations.  No lateral tilting patella.  No patellar apprehension.  No crepitation in the medial and lateral femoral condyle.  No proximal or distal swelling, edema or tenderness.  No gross motor or sensory deficits.  No intra-articular swelling.  2+ DP and PT pulses. No varus or valgus malalignment.  Skin is intact.  No rashes, scars or lesions.  \par \par Right Knee: \par Range of Motion in Degrees	\par 	                  Claimant:	Normal:	\par Flexion Active	  135 	                135-degrees	\par Flexion Passive	  135	                135-degrees	\par Extension Active	  0-5	                0-5-degrees	\par Extension Passive	  0-5	                0-5-degrees	\par \par No weakness to flexion/extension.  No evidence of instability in the AP plane or varus or valgus stress.  Negative  Lachman.  Negative pivot shift.  Negative anterior drawer test.  Negative posterior drawer test.  Negative Patricia.  Negative Apley grind.  No medial or lateral joint line tenderness.  Positive tenderness over the medial and lateral facet of the patella.  Positive patellofemoral crepitations.  No lateral tilting patella.  No patella apprehension.  Positive crepitation in the medial and lateral femoral condyle.  No proximal or distal swelling, edema or tenderness.  No gross motor or sensory deficits.  Mild intra-articular swelling.  2+ DP and PT pulses.  No varus or valgus malalignment.  Skin is intact.  No rashes, scars or lesions.   \par   [de-identified] : Ambulating with a slightly antalgic to antalgic gait.  Station:  Normal.  [de-identified] : Appearance:  Well-developed, well-nourished female in no acute distress.\par   [de-identified] : Radiographs, two views of the right knee, show mild degenerative changes.

## 2021-10-18 NOTE — DISCUSSION/SUMMARY
[de-identified] : At this time, due to osteoarthritis with possible meniscal tear of the right knee, I recommend a course of physical therapy and anti-inflammatory.  She will be reassessed in four to six weeks.

## 2021-10-18 NOTE — CONSULT LETTER
[Dear  ___] : Dear  [unfilled], [FreeTextEntry1] : \par I had the pleasure of evaluating your patient, Molly Hernandez.\par \par Thank you very much for allowing me to participate in the care of this patient.\par Please see my note below.\par \par If you have any questions, please do not hesitate to contact me.\par \par Sincerely,\par \par \par Owen Matthews III, MD\par SIRISHA/sg

## 2021-10-18 NOTE — HISTORY OF PRESENT ILLNESS
[de-identified] : The patient comes in today with complaints of pain to her right knee.  It has been going on since August.  She is not sure what she did. She notes pain on the medial side with catching. The patient states the onset/injury occurred in April of 2021.  This injury is not work related or due to an automobile accident.  The patient states the pain is intermittent and radiating.  The patient describes the pain as achy and shooting.  The patient notes rest, ice and medication makes her symptoms better, while walking and bending makes her symptoms worse.  The patient indicates a pain level of 6 on a pain scale of 0-10.   [] : No

## 2021-11-15 ENCOUNTER — APPOINTMENT (OUTPATIENT)
Dept: UROLOGY | Facility: CLINIC | Age: 61
End: 2021-11-15
Payer: COMMERCIAL

## 2021-11-15 VITALS
SYSTOLIC BLOOD PRESSURE: 166 MMHG | BODY MASS INDEX: 33.18 KG/M2 | HEART RATE: 76 BPM | HEIGHT: 60 IN | DIASTOLIC BLOOD PRESSURE: 96 MMHG | WEIGHT: 169 LBS

## 2021-11-15 DIAGNOSIS — Z85.51 PERSONAL HISTORY OF MALIGNANT NEOPLASM OF BLADDER: ICD-10-CM

## 2021-11-15 DIAGNOSIS — R39.15 URGENCY OF URINATION: ICD-10-CM

## 2021-11-15 PROCEDURE — 99204 OFFICE O/P NEW MOD 45 MIN: CPT

## 2021-11-15 NOTE — PHYSICAL EXAM
[General Appearance - Well Developed] : well developed [General Appearance - Well Nourished] : well nourished [General Appearance - In No Acute Distress] : no acute distress [FreeTextEntry1] : trace edema [Exaggerated Use Of Accessory Muscles For Inspiration] : no accessory muscle use [Abdomen Soft] : soft [Abdomen Tenderness] : non-tender [Costovertebral Angle Tenderness] : no ~M costovertebral angle tenderness [Normal Station and Gait] : the gait and station were normal for the patient's age [No Focal Deficits] : no focal deficits [Oriented To Time, Place, And Person] : oriented to person, place, and time

## 2021-11-15 NOTE — ASSESSMENT
[FreeTextEntry1] : remote hx of bladder ca. Very atypical pathology particularly given pts age at dx and lack of risk factors. No recurrence in many years. \par --UA, UCx, cyto\par --Obtain outside records. Pt previously sent records request. Had pt sign and sent again today\par --Cysto\par \par Urinary urgency. \par --Timed voiding\par --Decrease caffeine\par --Discussed trial of anitcholinergic but pt is not interested at this time.

## 2021-11-15 NOTE — HISTORY OF PRESENT ILLNESS
[FreeTextEntry1] : 62yo female with cc of bladder ca. 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 She followed with [facility] for many years. Last cysto was 7y and she was told to follow-up in 5y. Now no longer taking her insurance. No tobacco hx. No exposure hx. \par \par Dad with hx of bladder ca. \par \par At baseline, has some urinary urgency. She works as a nurse in [employer]. Tends to hold. Drinks 2 cups of coffee in the am and not

## 2021-11-16 LAB
APPEARANCE: CLEAR
BACTERIA UR CULT: NORMAL
BACTERIA: NEGATIVE
BILIRUBIN URINE: NEGATIVE
BLOOD URINE: NEGATIVE
COLOR: NORMAL
GLUCOSE QUALITATIVE U: NEGATIVE
KETONES URINE: NEGATIVE
LEUKOCYTE ESTERASE URINE: NEGATIVE
MICROSCOPIC-UA: NORMAL
NITRITE URINE: NEGATIVE
PH URINE: 6
PROTEIN URINE: NORMAL
RED BLOOD CELLS URINE: 2 /HPF
SPECIFIC GRAVITY URINE: 1.02
SQUAMOUS EPITHELIAL CELLS: 3 /HPF
URINE COMMENTS: NORMAL
UROBILINOGEN URINE: NORMAL
WHITE BLOOD CELLS URINE: 2 /HPF

## 2021-11-22 LAB — URINE CYTOLOGY: NORMAL

## 2021-12-03 ENCOUNTER — APPOINTMENT (OUTPATIENT)
Dept: NEUROLOGY | Facility: CLINIC | Age: 61
End: 2021-12-03

## 2021-12-18 ENCOUNTER — TRANSCRIPTION ENCOUNTER (OUTPATIENT)
Age: 61
End: 2021-12-18

## 2021-12-19 ENCOUNTER — FORM ENCOUNTER (OUTPATIENT)
Age: 61
End: 2021-12-19

## 2022-01-06 DIAGNOSIS — S83.206A UNSPECIFIED TEAR OF UNSPECIFIED MENISCUS, CURRENT INJURY, RIGHT KNEE, INITIAL ENCOUNTER: ICD-10-CM

## 2022-01-06 DIAGNOSIS — M17.11 UNILATERAL PRIMARY OSTEOARTHRITIS, RIGHT KNEE: ICD-10-CM

## 2022-01-10 ENCOUNTER — APPOINTMENT (OUTPATIENT)
Dept: UROLOGY | Facility: CLINIC | Age: 62
End: 2022-01-10
Payer: COMMERCIAL

## 2022-01-10 ENCOUNTER — OUTPATIENT (OUTPATIENT)
Dept: OUTPATIENT SERVICES | Facility: HOSPITAL | Age: 62
LOS: 1 days | End: 2022-01-10
Payer: COMMERCIAL

## 2022-01-10 VITALS
SYSTOLIC BLOOD PRESSURE: 177 MMHG | DIASTOLIC BLOOD PRESSURE: 98 MMHG | TEMPERATURE: 97.5 F | RESPIRATION RATE: 16 BRPM | OXYGEN SATURATION: 99 % | HEART RATE: 120 BPM

## 2022-01-10 DIAGNOSIS — R35.0 FREQUENCY OF MICTURITION: ICD-10-CM

## 2022-01-10 DIAGNOSIS — Z98.890 OTHER SPECIFIED POSTPROCEDURAL STATES: Chronic | ICD-10-CM

## 2022-01-10 DIAGNOSIS — Z85.51 PERSONAL HISTORY OF MALIGNANT NEOPLASM OF BLADDER: ICD-10-CM

## 2022-01-10 PROCEDURE — 52000 CYSTOURETHROSCOPY: CPT

## 2022-01-18 ENCOUNTER — NON-APPOINTMENT (OUTPATIENT)
Age: 62
End: 2022-01-18

## 2022-01-26 ENCOUNTER — NON-APPOINTMENT (OUTPATIENT)
Age: 62
End: 2022-01-26

## 2022-01-26 DIAGNOSIS — E78.5 HYPERLIPIDEMIA, UNSPECIFIED: ICD-10-CM

## 2022-03-04 ENCOUNTER — NON-APPOINTMENT (OUTPATIENT)
Age: 62
End: 2022-03-04

## 2022-03-04 DIAGNOSIS — M54.2 CERVICALGIA: ICD-10-CM

## 2022-03-05 ENCOUNTER — APPOINTMENT (OUTPATIENT)
Dept: MRI IMAGING | Facility: CLINIC | Age: 62
End: 2022-03-05
Payer: COMMERCIAL

## 2022-03-05 PROCEDURE — 73221 MRI JOINT UPR EXTREM W/O DYE: CPT | Mod: RT

## 2022-03-05 PROCEDURE — 72141 MRI NECK SPINE W/O DYE: CPT

## 2022-03-09 ENCOUNTER — APPOINTMENT (OUTPATIENT)
Dept: NEUROSURGERY | Facility: CLINIC | Age: 62
End: 2022-03-09
Payer: COMMERCIAL

## 2022-03-09 VITALS
HEART RATE: 62 BPM | HEIGHT: 60 IN | DIASTOLIC BLOOD PRESSURE: 77 MMHG | BODY MASS INDEX: 33.38 KG/M2 | TEMPERATURE: 97.7 F | OXYGEN SATURATION: 98 % | SYSTOLIC BLOOD PRESSURE: 128 MMHG | WEIGHT: 170 LBS

## 2022-03-09 DIAGNOSIS — M54.12 RADICULOPATHY, CERVICAL REGION: ICD-10-CM

## 2022-03-09 DIAGNOSIS — M25.511 PAIN IN RIGHT SHOULDER: ICD-10-CM

## 2022-03-09 PROCEDURE — 99203 OFFICE O/P NEW LOW 30 MIN: CPT

## 2022-03-09 NOTE — CONSULT LETTER
[Dear  ___] : Dear  [unfilled], [Courtesy Letter:] : I had the pleasure of seeing your patient, [unfilled], in my office today. 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  The patient does not recall any specific inciting event but states that her pain was quite severe radiating from the neck into the lateral posterior aspect of her bicep.  The patient does not recall any severe weakness but noticed worsening of her symptoms with neck movements.  The patient was started on a Medrol Dosepak and her pain has subsided significantly.  Currently, the patient's pain is rated at a 1-2/10 in severity.  The patient describes her pain as a burning sensation and now is only a dull ache at times.  The patient does experience some mild tingling in the arm at the forearm and wrist region currently.  Movements of the patient's neck elicits occasional "cracking and crunching" sounds.  The patient was organized an MRI scan of the neck and shoulder to rule out  structural pathology.\par \par The patient's past medical history is significant for hypercholesterolemia.  The patient has no allergies to medications and is currently not on any regular medications.\par \par On examination, the patient is alert, oriented, and compliant with exam.  The patient does not have a classic Spurling sign, but rotation of the head to the right with axial pressure generates discomfort down the right upper extremity.  Rotatory and flexion/extension movements of the neck do not precipitate any additional pain or neurologic changes at this time.  The patient demonstrates 5/5 strength in the upper and lower extremities bilaterally.  The patient does not have a Rancho sign in either hand nor ankle clonus.  The patient ambulates well.  Passive and active range of motion of the shoulder does not elicit any worsening pain.\par \par The patient is accompanied with an MRI scan of the cervical spine dated March 5, 2022.  These images demonstrate degenerative disc disease most prominently C5/6 and C6/7.  The patient does have foraminal stenosis throughout the cervical spine, which is likely congenital.  The patient's foraminal stenosis is worst at her degenerative levels at C5/6 and C6/7.  The patient additionally has facet arthrosis throughout the cervical spine.  The patient is additionally accompanied with an MRI scan of the right shoulder dated March 5, 2022.  These images demonstrate supraspinatus and infraspinatus tendinosis with low-grade tears, subacromial/subdeltoid bursitis, and moderate to severe acromioclavicular arthrosis.\par \par Taken together, the patient's clinical symptoms and radiographic findings are most consistent with a transient cervical radiculopathy.  I am encouraged to see the patient responding well to conservative therapy and I have recommended physical therapy at this time with specific emphasis on range of motion strengthening exercises of the neck.  I have additionally offered a referral to a shoulder specialist for evaluation of her MRI scan findings to be used as needed.  I have reassured the patient that the "cracking" sound that she hears when moving her neck is not worrisome and an unfortunate byproduct of arthritis and aging.  I have explained further to the patient that surgery for her cervical spine findings would only be considered if the patient has persistent and unremitting pain as a result.  At this time, we both agreed that surgical discussion is premature.  Given that the patient is responding so well to steroid medications, I explained to the patient that she may follow-up with us on an as-needed basis.  [FreeTextEntry3] : Robin Valencia MD, PhD, FRCSC, FAANS\par Attending Neurosurgeon\par  of Neurosurgery\par [facility] [employer] [facility]\par [facility] [place]\par [address],\[place] [address]\par Office: [phone]\par Fax: [phone]

## 2022-04-01 ENCOUNTER — FORM ENCOUNTER (OUTPATIENT)
Age: 62
End: 2022-04-01

## 2022-04-03 ENCOUNTER — TRANSCRIPTION ENCOUNTER (OUTPATIENT)
Age: 62
End: 2022-04-03

## 2022-04-19 ENCOUNTER — NON-APPOINTMENT (OUTPATIENT)
Age: 62
End: 2022-04-19

## 2022-04-19 DIAGNOSIS — F40.243 FEAR OF FLYING: ICD-10-CM

## 2022-07-16 ENCOUNTER — NON-APPOINTMENT (OUTPATIENT)
Age: 62
End: 2022-07-16

## 2022-07-21 ENCOUNTER — APPOINTMENT (OUTPATIENT)
Dept: DERMATOLOGY | Facility: CLINIC | Age: 62
End: 2022-07-21

## 2022-07-21 DIAGNOSIS — L71.9 ROSACEA, UNSPECIFIED: ICD-10-CM

## 2022-07-21 DIAGNOSIS — D22.9 MELANOCYTIC NEVI, UNSPECIFIED: ICD-10-CM

## 2022-07-21 DIAGNOSIS — L57.0 ACTINIC KERATOSIS: ICD-10-CM

## 2022-07-21 PROCEDURE — 99213 OFFICE O/P EST LOW 20 MIN: CPT | Mod: 25

## 2022-07-21 PROCEDURE — 17000 DESTRUCT PREMALG LESION: CPT

## 2022-08-17 ENCOUNTER — APPOINTMENT (OUTPATIENT)
Dept: MAMMOGRAPHY | Facility: CLINIC | Age: 62
End: 2022-08-17

## 2022-08-17 ENCOUNTER — OUTPATIENT (OUTPATIENT)
Dept: OUTPATIENT SERVICES | Facility: HOSPITAL | Age: 62
LOS: 1 days | End: 2022-08-17
Payer: COMMERCIAL

## 2022-08-17 ENCOUNTER — APPOINTMENT (OUTPATIENT)
Dept: ULTRASOUND IMAGING | Facility: CLINIC | Age: 62
End: 2022-08-17

## 2022-08-17 DIAGNOSIS — Z00.8 ENCOUNTER FOR OTHER GENERAL EXAMINATION: ICD-10-CM

## 2022-08-17 DIAGNOSIS — Z98.890 OTHER SPECIFIED POSTPROCEDURAL STATES: Chronic | ICD-10-CM

## 2022-08-17 PROCEDURE — 76641 ULTRASOUND BREAST COMPLETE: CPT | Mod: 26,50

## 2022-08-17 PROCEDURE — 77063 BREAST TOMOSYNTHESIS BI: CPT | Mod: 26

## 2022-08-17 PROCEDURE — 77067 SCR MAMMO BI INCL CAD: CPT

## 2022-08-17 PROCEDURE — 76641 ULTRASOUND BREAST COMPLETE: CPT

## 2022-08-17 PROCEDURE — 77067 SCR MAMMO BI INCL CAD: CPT | Mod: 26

## 2022-08-17 PROCEDURE — 77063 BREAST TOMOSYNTHESIS BI: CPT

## 2022-09-02 ENCOUNTER — TRANSCRIPTION ENCOUNTER (OUTPATIENT)
Age: 62
End: 2022-09-02

## 2022-09-02 ENCOUNTER — NON-APPOINTMENT (OUTPATIENT)
Age: 62
End: 2022-09-02

## 2022-09-02 ENCOUNTER — APPOINTMENT (OUTPATIENT)
Dept: FAMILY MEDICINE | Facility: CLINIC | Age: 62
End: 2022-09-02

## 2022-09-02 VITALS
WEIGHT: 177 LBS | HEART RATE: 66 BPM | DIASTOLIC BLOOD PRESSURE: 80 MMHG | HEIGHT: 60 IN | BODY MASS INDEX: 34.75 KG/M2 | TEMPERATURE: 97.5 F | SYSTOLIC BLOOD PRESSURE: 122 MMHG | OXYGEN SATURATION: 98 %

## 2022-09-02 DIAGNOSIS — R23.2 FLUSHING: ICD-10-CM

## 2022-09-02 PROCEDURE — 93000 ELECTROCARDIOGRAM COMPLETE: CPT

## 2022-09-02 PROCEDURE — 36415 COLL VENOUS BLD VENIPUNCTURE: CPT

## 2022-09-02 PROCEDURE — 99396 PREV VISIT EST AGE 40-64: CPT | Mod: 25

## 2022-09-02 RX ORDER — METHYLPREDNISOLONE 4 MG/1
4 TABLET ORAL
Qty: 1 | Refills: 0 | Status: DISCONTINUED | COMMUNITY
Start: 2022-03-04 | End: 2022-09-02

## 2022-09-02 NOTE — PHYSICAL EXAM
[No Acute Distress] : no acute distress [Well Developed] : well developed [Well-Appearing] : well-appearing [Normal Sclera/Conjunctiva] : normal sclera/conjunctiva [EOMI] : extraocular movements intact [No JVD] : no jugular venous distention [No Lymphadenopathy] : no lymphadenopathy [Supple] : supple [Thyroid Normal, No Nodules] : the thyroid was normal and there were no nodules present [No Respiratory Distress] : no respiratory distress  [No Accessory Muscle Use] : no accessory muscle use [Clear to Auscultation] : lungs were clear to auscultation bilaterally [Normal Rate] : normal rate  [Regular Rhythm] : with a regular rhythm [Normal S1, S2] : normal S1 and S2 [No Varicosities] : no varicosities [Pedal Pulses Present] : the pedal pulses are present [No Edema] : there was no peripheral edema [No Extremity Clubbing/Cyanosis] : no extremity clubbing/cyanosis [Soft] : abdomen soft [Non Tender] : non-tender [Non-distended] : non-distended [No Masses] : no abdominal mass palpated [No HSM] : no HSM [Normal Bowel Sounds] : normal bowel sounds [Normal Posterior Cervical Nodes] : no posterior cervical lymphadenopathy [Normal Anterior Cervical Nodes] : no anterior cervical lymphadenopathy [No Joint Swelling] : no joint swelling [Grossly Normal Strength/Tone] : grossly normal strength/tone [No Rash] : no rash [Coordination Grossly Intact] : coordination grossly intact [No Focal Deficits] : no focal deficits [Normal Gait] : normal gait [Normal Affect] : the affect was normal [Normal Insight/Judgement] : insight and judgment were intact [de-identified] : overweight  [de-identified] : 2/6 soft systolic murmur heard LUSB with radiation to B neck  [de-identified] : see above

## 2022-09-02 NOTE — ASSESSMENT
[FreeTextEntry1] : ADRIANNA PAK is a 62 year old female here for a physical exam.  She is also here to follow up on medical issues as noted above.\par \par Patient has a history of GERD (quiet recently), hypercholesterolemia, bladder cancer, impaired fasting glucose, and osteoarthritis, and hot flashes.

## 2022-09-02 NOTE — HISTORY OF PRESENT ILLNESS
[de-identified] : Her last physical exam was last year\par \par Vaccines:\par Tetanus is NOT up to date\par Shingrix is NOT up to date; last 10/2020 and second dose was delayed due to the fact she had to get COVID vacc series \par COVID vaccine is up to date\par \par Her last dentist visit was less than one year ago\par Her last eye doctor appointment was less than one year ago\par Her last dermatologist visit was less than one year ago, Dr. Valdez\par \par GYN visit is up to date; last 6/2022, Dr. Lockhart ([place])\par Mammogram is up to date; last 8/2022 ([facility])-- mammo/breast US\par Colon cancer screening is up to date but due again now; colonoscopy 2018 polyp, repeat by 5 years (Dr. Upton), has appt 10/2022 with Dr. Johnson\par DEXA is up to date; last 6/2021 improved osteopenia \par \par Her diet is healthy overall\par Exercise: walking regularly\par \par Molly has high chol, IFG, GERD, and a remote h/o bladder cancer. \par \par She had eval with Dr. Sykes in 2021 and she heard a carotid bruit. She had carotid US and Echo and stress test and Holter and results were all essentially wnl. She has stuck with clean eating/exercise regimen and Dr. Sykes recommended she add rosuvastatin 5 mg daily as her lipids were persistently elevated despite lifestyle measures. She did not yet get to start the Rosuvastatin and has some reservations about poss SE/muscle aches from statin but is willing to consider trying it \par \par F/U GERD/heartburn. Was able to d/c PPI med and change to H2 blocker over past year and only uses H2 blocker when needed and not needed daily. Weight loss has helped improve GERD also. \par \par She had bladder cancer dx in her 30s when was referred by gyn to urol due to mild bladder tenderness noted on routine gyn exam. Urol eval showed very early stage bladder cancer that was able to be completely excised during cystoscopy and no adjuvant therapy was needed. She had f/u cystoscopies for years and always wnl. Since last visit here she has established local care with Dr. Ruby (urol) and is UTD on f/u/surveillance. 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 Valencia in early 2022 for sudden onset acute cervical neck pain/RUE pain and dysesthesias and had MRI and in the end was felt to be a transient cervical radiculopathy. Did PT and sxs resolved. Also has cervical OA noted on MRI\par \par Still has hot flashes and is nearly 10 yrs into menopause. They are manageable and we disc too late for HT at this stage but can consider trial Effexor if she wants (she defers on adding another med for now but can let gyn/me know if wants to try this in the future) and also encouraged incr exercise freq/intensity as will likely help ease hot flashes at least somewhat \par

## 2022-09-02 NOTE — HEALTH RISK ASSESSMENT
[0] : 2) Feeling down, depressed, or hopeless: Not at all (0) [PHQ-2 Negative - No further assessment needed] : PHQ-2 Negative - No further assessment needed [[id number]] : 0

## 2022-09-02 NOTE — REVIEW OF SYSTEMS
[Recent Change In Weight] : ~T recent weight change [Palpitations] : palpitations [Heartburn] : heartburn [Anxiety] : anxiety [Negative] : Heme/Lymph [Hot Flashes] : hot flashes [Fever] : no fever [Chills] : no chills [Fatigue] : no fatigue [Chest Pain] : no chest pain [Lower Ext Edema] : no lower extremity edema [Orthopnea] : no orthopnea [Paroxysmal Nocturnal Dyspnea] : no paroxysmal nocturnal dyspnea [Abdominal Pain] : no abdominal pain [Diarrhea] : diarrhea [Vomiting] : no vomiting [Melena] : no melena [Insomnia] : no insomnia [Depression] : no depression [FreeTextEntry2] : hot flashes, some wgt gain since last visit  [FreeTextEntry5] : occas palps, had eval with Dr. Sykes and all was benign/wnl  [FreeTextEntry7] : heartburn much improved over past year [de-identified] : flight anxiety, uses diazepam prn with good effect and med is well tolerated

## 2022-09-02 NOTE — PLAN
[FreeTextEntry1] : Continue all medications as prescribed. Check labs as above. Will adjust any medications based upon lab results.\par \par Reviewed age-appropriate preventive screening tests with patient. UTD on colonoscopy, DEXA, gyn exam and mammogram and also has next colonoscopy scheduled for near future .\par \par Tdap, Shingrix #2, flu vacc revd/recommended and she will consider. She will get flu vacc at work and check on Tdap date. Will RTO next week or near future for Shingrix #2 to complete that series.  \par \par ECG shows nonspecific QRS widening, stable from prior. LDL goal <=100 ideally.  Reviewed risks/benefits of statin med. Recommended excellent hydration +/- co Q10 (100-400 mg daily) to decrease risk for statin related myalgias. In her case I recommend she try the Rosuvastatin and we revd card test results/card recommendations pros/cons and after discussion she states she is willing to give it a try. If not able to tolerate low dose statin med with excellent hydration/CoQ10 she can stop it and let me know\par \par Discussed clean eating (eg Mediterranean style eating plan) and regular exercise/staying as physically active as possible.  Include balance exercises and strength training and core strengthening exercises for bone health and to decrease risk for falls. \par \par Recommended calcium 6524-3030 mg daily ideally mainly or fully from food sources +/- supplement if needed, vit D 3738-9379 IU or whatever dose is needed to get D into 30-80 range. Recommended regular weight bearing exercise as well as strength training exercise 3 or more times per week and balance exercises regularly. \par \par F/u with urol (Q5yrs and prn, last eval 2022) and card (Q few yrs and prn, last eval 2021) as recommended by them\par \par Reviewed importance of good self care (e.g. meditation, yoga, adequate rest, regular exercise, magnesium, clean eating, etc.).\par \par Revd r/b/se benzodiazepine meds and need to cont to safeguard supply and limit use of meds as much as possible and she will continue to do so. \par \par Follow up for next physical in one year. Chol RPA visit and repeat fasting labs in 6 months.

## 2022-09-04 ENCOUNTER — TRANSCRIPTION ENCOUNTER (OUTPATIENT)
Age: 62
End: 2022-09-04

## 2022-09-04 LAB
ALBUMIN SERPL ELPH-MCNC: 4.5 G/DL
ALP BLD-CCNC: 57 U/L
ALT SERPL-CCNC: 14 U/L
ANION GAP SERPL CALC-SCNC: 10 MMOL/L
AST SERPL-CCNC: 17 U/L
BILIRUB SERPL-MCNC: 0.4 MG/DL
BUN SERPL-MCNC: 13 MG/DL
CALCIUM SERPL-MCNC: 9.8 MG/DL
CHLORIDE SERPL-SCNC: 106 MMOL/L
CHOLEST SERPL-MCNC: 239 MG/DL
CO2 SERPL-SCNC: 28 MMOL/L
CREAT SERPL-MCNC: 0.74 MG/DL
EGFR: 91 ML/MIN/1.73M2
ESTIMATED AVERAGE GLUCOSE: 114 MG/DL
GLUCOSE SERPL-MCNC: 100 MG/DL
HBA1C MFR BLD HPLC: 5.6 %
HDLC SERPL-MCNC: 53 MG/DL
LDLC SERPL CALC-MCNC: 171 MG/DL
NONHDLC SERPL-MCNC: 186 MG/DL
POTASSIUM SERPL-SCNC: 5.1 MMOL/L
PROT SERPL-MCNC: 6.9 G/DL
SODIUM SERPL-SCNC: 144 MMOL/L
TRIGL SERPL-MCNC: 75 MG/DL

## 2022-10-05 ENCOUNTER — APPOINTMENT (OUTPATIENT)
Dept: GASTROENTEROLOGY | Facility: CLINIC | Age: 62
End: 2022-10-05

## 2022-10-05 VITALS
HEART RATE: 76 BPM | DIASTOLIC BLOOD PRESSURE: 84 MMHG | BODY MASS INDEX: 34.75 KG/M2 | HEIGHT: 60 IN | SYSTOLIC BLOOD PRESSURE: 142 MMHG | WEIGHT: 177 LBS

## 2022-10-05 DIAGNOSIS — Z86.010 PERSONAL HISTORY OF COLONIC POLYPS: ICD-10-CM

## 2022-10-05 PROCEDURE — 99204 OFFICE O/P NEW MOD 45 MIN: CPT

## 2022-10-10 ENCOUNTER — APPOINTMENT (OUTPATIENT)
Dept: GASTROENTEROLOGY | Facility: CLINIC | Age: 62
End: 2022-10-10

## 2022-10-10 PROBLEM — Z86.010 HISTORY OF COLON POLYPS: Status: ACTIVE | Noted: 2022-10-10

## 2022-10-10 NOTE — HISTORY OF PRESENT ILLNESS
[FreeTextEntry1] : 62 year old woman with HLD, here for surveillance colonoscopy. \par \par Patient is without acute complaints. She he has been doing well. No abdominal pain. No post prandial issues. No vomiting. Stable weight. Good appetite. No change in BM"s, no diarrhea. No overt signs of GI bleeding like hematemesis, melena, hematochezia. She had a polyp 5 years ago, and is due for surveillance colonoscopy.

## 2022-10-10 NOTE — ASSESSMENT
[FreeTextEntry1] : 62 year old woman with HLD, here for surveillance colonoscopy. \par \par WIll plan for surveillance colonoscopy. Miralax prep ordered.

## 2022-10-10 NOTE — PHYSICAL EXAM
[Alert] : alert [Normal Voice/Communication] : normal voice/communication [Healthy Appearing] : healthy appearing [No Acute Distress] : no acute distress [Hearing Threshold Finger Rub Not [place]] : hearing was normal [Sclera] : the sclera and conjunctiva were normal [Normal Appearance] : the appearance of the neck was normal [No Neck Mass] : no neck mass was observed [Abnormal Walk] : normal gait [No Clubbing, Cyanosis] : no clubbing or cyanosis of the fingernails [Normal Color / Pigmentation] : normal skin color and pigmentation [] : no rash [No Focal Deficits] : no focal deficits [Skin Lesions] : no skin lesions [Motor Exam] : the motor exam was normal [Oriented To Time, Place, And Person] : oriented to person, place, and time

## 2022-11-05 ENCOUNTER — LABORATORY RESULT (OUTPATIENT)
Age: 62
End: 2022-11-05

## 2022-11-07 ENCOUNTER — NON-APPOINTMENT (OUTPATIENT)
Age: 62
End: 2022-11-07

## 2022-11-09 ENCOUNTER — RESULT REVIEW (OUTPATIENT)
Age: 62
End: 2022-11-09

## 2022-11-09 ENCOUNTER — APPOINTMENT (OUTPATIENT)
Dept: GASTROENTEROLOGY | Facility: AMBULATORY MEDICAL SERVICES | Age: 62
End: 2022-11-09

## 2022-11-09 PROCEDURE — 45385 COLONOSCOPY W/LESION REMOVAL: CPT | Mod: GC

## 2022-11-14 ENCOUNTER — NON-APPOINTMENT (OUTPATIENT)
Age: 62
End: 2022-11-14

## 2022-12-07 ENCOUNTER — FORM ENCOUNTER (OUTPATIENT)
Age: 62
End: 2022-12-07

## 2022-12-07 ENCOUNTER — NON-APPOINTMENT (OUTPATIENT)
Age: 62
End: 2022-12-07

## 2022-12-17 ENCOUNTER — NON-APPOINTMENT (OUTPATIENT)
Age: 62
End: 2022-12-17

## 2022-12-17 ENCOUNTER — FORM ENCOUNTER (OUTPATIENT)
Age: 62
End: 2022-12-17

## 2023-03-03 ENCOUNTER — APPOINTMENT (OUTPATIENT)
Dept: FAMILY MEDICINE | Facility: CLINIC | Age: 63
End: 2023-03-03
Payer: COMMERCIAL

## 2023-03-03 VITALS
SYSTOLIC BLOOD PRESSURE: 124 MMHG | WEIGHT: 178 LBS | BODY MASS INDEX: 34.95 KG/M2 | OXYGEN SATURATION: 98 % | TEMPERATURE: 98.4 F | HEIGHT: 60 IN | HEART RATE: 63 BPM | DIASTOLIC BLOOD PRESSURE: 78 MMHG

## 2023-03-03 DIAGNOSIS — Z85.51 PERSONAL HISTORY OF MALIGNANT NEOPLASM OF BLADDER: ICD-10-CM

## 2023-03-03 DIAGNOSIS — K21.9 GASTRO-ESOPHAGEAL REFLUX DISEASE W/OUT ESOPHAGITIS: ICD-10-CM

## 2023-03-03 PROCEDURE — 36415 COLL VENOUS BLD VENIPUNCTURE: CPT

## 2023-03-03 PROCEDURE — 99214 OFFICE O/P EST MOD 30 MIN: CPT | Mod: 25

## 2023-03-03 NOTE — HEALTH RISK ASSESSMENT
[0] : 2) Feeling down, depressed, or hopeless: Not at all (0) [PHQ-2 Negative - No further assessment needed] : PHQ-2 Negative - No further assessment needed [Never] : Never [[id number]] : 0

## 2023-03-03 NOTE — ASSESSMENT
[FreeTextEntry1] : ADRIANNA PAK is a 62 year old female here for follow up on medical issues as noted above.\par \par She is here to follow up on high cholesterol, IFG, GERD, and remote h/o bladder cancer.

## 2023-03-03 NOTE — HISTORY OF PRESENT ILLNESS
[FreeTextEntry1] : ADRIANNA PAK is a 62 year old female here for a follow up visit.\par  [de-identified] : Molly has a history of high cholesterol, IFG, GERD, colon polyps, and a remote h/o bladder cancer, and situational (eg flight related) anxiety.\par \par She had eval with Dr. Sykes in 2021 and she heard a carotid bruit. She had carotid US and Echo and stress test and Holter and results were all essentially wnl. She has stuck with clean eating/exercise regimen and Dr. Sykes recommended she add rosuvastatin 5 mg daily as her lipids were persistently elevated despite lifestyle measures. Started Rosuvastatin 5 mg daily 11/2022 and is hydrating well and tolerating well and is here for lab recheck.\par \par F/U GERD/heartburn. Was able to d/c PPI med and change to H2 blocker over past year and only uses H2 blocker when needed and not needed daily. \par \par She had bladder cancer dx in her 30s when was referred by gyn to urol due to mild bladder tenderness noted on routine gyn exam. Urol eval showed very early stage bladder cancer that was able to be completely excised during cystoscopy and no adjuvant therapy was needed. She had f/u cystoscopies for years and always wnl. Since last visit here she has established local care with Dr. Ruby (uro) and is UTD on f/u/surveillance. Was told does not need annual surveillance at this point and could f/u at 5 yr kourtney, or sooner if any concerns\par \par She had a colonoscopy with Dr. Hernandez in 11/2022 that showed a small polyp. Plan is to repeat colonoscopy at the 5 yr kourtney. \par \par She is frustrated re wgt gain over past year. Is going to work to try to increase levels of physical activity and cut back on later night snacking/desserts a bit to try to lose some wgt \par

## 2023-03-03 NOTE — PLAN
[FreeTextEntry1] : She will RTO next week for Shingrix #2 to complete series as had a dose in 2020 but never had a chance to do second dose (she does not need to restart series) .  Also considering Tdap booster at some point and will see if can get this at work.  \par \par Continue all medications as prescribed. Check labs as above. Will adjust any medications based upon lab results.\par LDL goal <=100 ideally. Reviewed risks/benefits of statin med. Recommended excellent hydration +/- co Q10 (100-400 mg daily) to decrease risk for statin related myalgias.\par \par Discussed clean eating (eg Mediterranean style eating plan) and regular exercise/staying as physically active as possible. Include balance exercises and strength training and core strengthening exercises for bone health and to decrease risk for falls. \par \par Recommended calcium 7368-5645 mg daily ideally mainly or fully from food sources +/- supplement if needed, vit D 4622-3808 IU or whatever dose is needed to get D into 30-80 range. Recommended regular weight bearing exercise as well as strength training exercise 3 or more times per week and balance exercises regularly. \par \par Revd that focus should be on making healthful food choices most of the time and being mindful of portions and of being as physically active as possible and not to beat self up about number on scale. \par \par F/u with urol (Q5yrs and prn, last eval 2022) and card (Q few yrs and prn, last eval 2021) as recommended by them\par \par Reviewed importance of good self care (e.g. meditation, yoga, adequate rest, regular exercise, magnesium, clean eating, etc.).\par \par Revd r/b/se benzodiazepine meds and need to cont to safeguard supply and limit use of meds as much as possible and she will continue to do so. \par \par CPE/ RPA visit (chol etc) and repeat fasting labs in about 6 months.\par

## 2023-03-03 NOTE — PHYSICAL EXAM
[No Acute Distress] : no acute distress [Well Developed] : well developed [Well-Appearing] : well-appearing [Normal Sclera/Conjunctiva] : normal sclera/conjunctiva [EOMI] : extraocular movements intact [No JVD] : no jugular venous distention [No Lymphadenopathy] : no lymphadenopathy [Supple] : supple [Thyroid Normal, No Nodules] : the thyroid was normal and there were no nodules present [No Respiratory Distress] : no respiratory distress  [No Accessory Muscle Use] : no accessory muscle use [Clear to Auscultation] : lungs were clear to auscultation bilaterally [Normal Rate] : normal rate  [Regular Rhythm] : with a regular rhythm [Normal S1, S2] : normal S1 and S2 [No Varicosities] : no varicosities [Pedal Pulses Present] : the pedal pulses are present [No Edema] : there was no peripheral edema [No Extremity Clubbing/Cyanosis] : no extremity clubbing/cyanosis [Soft] : abdomen soft [Non Tender] : non-tender [Non-distended] : non-distended [No Masses] : no abdominal mass palpated [No HSM] : no HSM [Normal Bowel Sounds] : normal bowel sounds [No Joint Swelling] : no joint swelling [Grossly Normal Strength/Tone] : grossly normal strength/tone [No Rash] : no rash [Coordination Grossly Intact] : coordination grossly intact [No Focal Deficits] : no focal deficits [Normal Gait] : normal gait [Normal Affect] : the affect was normal [Normal Insight/Judgement] : insight and judgment were intact [de-identified] : overweight BMI 34 [de-identified] : 2/6 soft systolic murmur heard LUSB with radiation to B neck

## 2023-03-03 NOTE — REVIEW OF SYSTEMS
[Hot Flashes] : hot flashes [Recent Change In Weight] : ~T recent weight change [Palpitations] : palpitations [Heartburn] : heartburn [Anxiety] : anxiety [Negative] : Heme/Lymph [Fever] : no fever [Chills] : no chills [Fatigue] : no fatigue [Chest Pain] : no chest pain [Lower Ext Edema] : no lower extremity edema [Orthopnea] : no orthopnea [Paroxysmal Nocturnal Dyspnea] : no paroxysmal nocturnal dyspnea [Abdominal Pain] : no abdominal pain [Diarrhea] : diarrhea [Vomiting] : no vomiting [Melena] : no melena [Insomnia] : no insomnia [Depression] : no depression [FreeTextEntry2] : hot flashes, some wgt gain over past year  [FreeTextEntry5] : occas palps, had eval with Dr. Sykes and all was benign/wnl  [FreeTextEntry7] : heartburn much improved over past year, uses Pepcid complete prn  [de-identified] : flight anxiety, uses diazepam prn with good effect and med is well tolerated

## 2023-03-04 ENCOUNTER — TRANSCRIPTION ENCOUNTER (OUTPATIENT)
Age: 63
End: 2023-03-04

## 2023-03-04 LAB
ALBUMIN SERPL ELPH-MCNC: 4.4 G/DL
ALP BLD-CCNC: 59 U/L
ALT SERPL-CCNC: 26 U/L
ANION GAP SERPL CALC-SCNC: 16 MMOL/L
AST SERPL-CCNC: 25 U/L
BILIRUB SERPL-MCNC: 0.4 MG/DL
BUN SERPL-MCNC: 11 MG/DL
CALCIUM SERPL-MCNC: 9.7 MG/DL
CHLORIDE SERPL-SCNC: 107 MMOL/L
CHOLEST SERPL-MCNC: 167 MG/DL
CO2 SERPL-SCNC: 20 MMOL/L
CREAT SERPL-MCNC: 0.67 MG/DL
EGFR: 99 ML/MIN/1.73M2
ESTIMATED AVERAGE GLUCOSE: 114 MG/DL
GLUCOSE SERPL-MCNC: 96 MG/DL
HBA1C MFR BLD HPLC: 5.6 %
HDLC SERPL-MCNC: 52 MG/DL
LDLC SERPL CALC-MCNC: 101 MG/DL
NONHDLC SERPL-MCNC: 115 MG/DL
POTASSIUM SERPL-SCNC: 4.2 MMOL/L
PROT SERPL-MCNC: 6.7 G/DL
SODIUM SERPL-SCNC: 143 MMOL/L
TRIGL SERPL-MCNC: 71 MG/DL

## 2023-03-14 ENCOUNTER — FORM ENCOUNTER (OUTPATIENT)
Age: 63
End: 2023-03-14

## 2023-03-17 ENCOUNTER — APPOINTMENT (OUTPATIENT)
Dept: INTERNAL MEDICINE | Facility: CLINIC | Age: 63
End: 2023-03-17

## 2023-04-12 ENCOUNTER — APPOINTMENT (OUTPATIENT)
Dept: ORTHOPEDIC SURGERY | Facility: CLINIC | Age: 63
End: 2023-04-12
Payer: COMMERCIAL

## 2023-04-12 DIAGNOSIS — M16.12 UNILATERAL PRIMARY OSTEOARTHRITIS, LEFT HIP: ICD-10-CM

## 2023-04-12 PROCEDURE — 73502 X-RAY EXAM HIP UNI 2-3 VIEWS: CPT

## 2023-04-12 PROCEDURE — 99204 OFFICE O/P NEW MOD 45 MIN: CPT

## 2023-04-16 PROBLEM — M16.12 PRIMARY OSTEOARTHRITIS OF LEFT HIP: Status: ACTIVE | Noted: 2023-04-16

## 2023-04-16 NOTE — DATA REVIEWED
[FreeTextEntry1] : X-rays done in the office today the AP pelvis 1 view of the left hip 2 view shows moderate osteoarthritic changes of the left hip particularly on the central and inferior portion of the joint.  There is no narrowing superiorly.  The right hip is unaffected.  There is no obvious tumors masses or calcifications seen.

## 2023-04-16 NOTE — DISCUSSION/SUMMARY
[de-identified] : Plan at this time is to repeat a Medrol Dosepak and then we will start her on meloxicam 7.5 mg once daily with food for a week or 2.  If she still has pain we will consider an intra-articular injection of the left hip under fluoroscopy.

## 2023-04-16 NOTE — IMAGING
[de-identified] : Left hip exam: The patient presents in no apparent distress. Neurovascularly intact. Sensation intact to left lower extremity. No scars, cuts or abrasions. 2+ pulses to posterior tibialis. ROM is slightly diminished with pain at the extremes.. + abductor tenderness. + groin pain. + lateral hip tenderness. - radiculopathy. + straight leg raise. 4/5 abductor strength due to pain. + pain with forced ER/IR.\par \par

## 2023-04-16 NOTE — HISTORY OF PRESENT ILLNESS
[Left Leg] : left leg [Sudden] : sudden [8] : 8 [3] : 3 [Dull/Aching] : dull/aching [Stabbing] : stabbing [Throbbing] : throbbing [Constant] : constant [Rest] : rest [Standing] : standing [Walking] : walking [Bending forward] : bending forward [Stairs] : stairs [] : no [FreeTextEntry5] : Worsening right hip pain of 2 months without injury

## 2023-04-16 NOTE — ASSESSMENT
[FreeTextEntry1] : The patient is a 61 yo woman presenting with acutely worsening left hip pain over two months without trauma. It has increased in pain over the past two weeks without trauma. She has constant pain that is exacerbated with walking and any weightbearing. She does not have pain with stairs, but does with distance walking and getting in and out of cars. Her pain radiates to the groin and anterior thigh. She has recently tried a medrol dose kenneth with some relief. She denies paresthesias or numbness. She has not had any other treatment.

## 2023-05-12 ENCOUNTER — APPOINTMENT (OUTPATIENT)
Dept: CT IMAGING | Facility: CLINIC | Age: 63
End: 2023-05-12

## 2023-06-13 ENCOUNTER — RESULT CHARGE (OUTPATIENT)
Age: 63
End: 2023-06-13

## 2023-06-14 ENCOUNTER — APPOINTMENT (OUTPATIENT)
Dept: CT IMAGING | Facility: CLINIC | Age: 63
End: 2023-06-14
Payer: COMMERCIAL

## 2023-06-14 ENCOUNTER — OUTPATIENT (OUTPATIENT)
Dept: OUTPATIENT SERVICES | Facility: HOSPITAL | Age: 63
LOS: 1 days | End: 2023-06-14
Payer: COMMERCIAL

## 2023-06-14 DIAGNOSIS — Z00.8 ENCOUNTER FOR OTHER GENERAL EXAMINATION: ICD-10-CM

## 2023-06-14 DIAGNOSIS — Z98.890 OTHER SPECIFIED POSTPROCEDURAL STATES: Chronic | ICD-10-CM

## 2023-06-14 DIAGNOSIS — R03.0 ELEVATED BLOOD-PRESSURE READING, WITHOUT DIAGNOSIS OF HYPERTENSION: ICD-10-CM

## 2023-06-14 PROCEDURE — 75571 CT HRT W/O DYE W/CA TEST: CPT | Mod: 26

## 2023-06-14 PROCEDURE — 75571 CT HRT W/O DYE W/CA TEST: CPT

## 2023-06-26 ENCOUNTER — NON-APPOINTMENT (OUTPATIENT)
Age: 63
End: 2023-06-26

## 2023-06-27 ENCOUNTER — OUTPATIENT (OUTPATIENT)
Dept: OUTPATIENT SERVICES | Facility: HOSPITAL | Age: 63
LOS: 1 days | End: 2023-06-27
Payer: COMMERCIAL

## 2023-06-27 ENCOUNTER — APPOINTMENT (OUTPATIENT)
Dept: ULTRASOUND IMAGING | Facility: CLINIC | Age: 63
End: 2023-06-27
Payer: COMMERCIAL

## 2023-06-27 ENCOUNTER — RESULT REVIEW (OUTPATIENT)
Age: 63
End: 2023-06-27

## 2023-06-27 DIAGNOSIS — Z98.890 OTHER SPECIFIED POSTPROCEDURAL STATES: Chronic | ICD-10-CM

## 2023-06-27 DIAGNOSIS — M16.12 UNILATERAL PRIMARY OSTEOARTHRITIS, LEFT HIP: ICD-10-CM

## 2023-06-27 PROCEDURE — 20611 DRAIN/INJ JOINT/BURSA W/US: CPT

## 2023-06-27 PROCEDURE — 20611 DRAIN/INJ JOINT/BURSA W/US: CPT | Mod: LT

## 2023-06-29 ENCOUNTER — OUTPATIENT (OUTPATIENT)
Dept: OUTPATIENT SERVICES | Facility: HOSPITAL | Age: 63
LOS: 1 days | End: 2023-06-29
Payer: COMMERCIAL

## 2023-06-29 ENCOUNTER — APPOINTMENT (OUTPATIENT)
Dept: RADIOLOGY | Facility: CLINIC | Age: 63
End: 2023-06-29
Payer: COMMERCIAL

## 2023-06-29 DIAGNOSIS — Z00.8 ENCOUNTER FOR OTHER GENERAL EXAMINATION: ICD-10-CM

## 2023-06-29 DIAGNOSIS — Z98.890 OTHER SPECIFIED POSTPROCEDURAL STATES: Chronic | ICD-10-CM

## 2023-06-29 PROCEDURE — 77080 DXA BONE DENSITY AXIAL: CPT | Mod: 26

## 2023-06-29 PROCEDURE — 77080 DXA BONE DENSITY AXIAL: CPT

## 2023-08-01 ENCOUNTER — NON-APPOINTMENT (OUTPATIENT)
Age: 63
End: 2023-08-01

## 2023-08-18 ENCOUNTER — APPOINTMENT (OUTPATIENT)
Dept: MAMMOGRAPHY | Facility: CLINIC | Age: 63
End: 2023-08-18
Payer: COMMERCIAL

## 2023-08-18 ENCOUNTER — APPOINTMENT (OUTPATIENT)
Dept: ULTRASOUND IMAGING | Facility: CLINIC | Age: 63
End: 2023-08-18
Payer: COMMERCIAL

## 2023-08-18 ENCOUNTER — OUTPATIENT (OUTPATIENT)
Dept: OUTPATIENT SERVICES | Facility: HOSPITAL | Age: 63
LOS: 1 days | End: 2023-08-18
Payer: COMMERCIAL

## 2023-08-18 DIAGNOSIS — Z98.890 OTHER SPECIFIED POSTPROCEDURAL STATES: Chronic | ICD-10-CM

## 2023-08-18 DIAGNOSIS — Z00.8 ENCOUNTER FOR OTHER GENERAL EXAMINATION: ICD-10-CM

## 2023-08-18 PROCEDURE — 77067 SCR MAMMO BI INCL CAD: CPT | Mod: 26

## 2023-08-18 PROCEDURE — 77063 BREAST TOMOSYNTHESIS BI: CPT | Mod: 26

## 2023-08-18 PROCEDURE — 77063 BREAST TOMOSYNTHESIS BI: CPT

## 2023-08-18 PROCEDURE — 76641 ULTRASOUND BREAST COMPLETE: CPT | Mod: 26,50

## 2023-08-18 PROCEDURE — 76641 ULTRASOUND BREAST COMPLETE: CPT

## 2023-08-18 PROCEDURE — 77067 SCR MAMMO BI INCL CAD: CPT

## 2023-08-21 ENCOUNTER — APPOINTMENT (OUTPATIENT)
Dept: UROLOGY | Facility: CLINIC | Age: 63
End: 2023-08-21
Payer: COMMERCIAL

## 2023-08-21 VITALS — DIASTOLIC BLOOD PRESSURE: 97 MMHG | HEART RATE: 105 BPM | SYSTOLIC BLOOD PRESSURE: 163 MMHG

## 2023-08-21 PROCEDURE — 99214 OFFICE O/P EST MOD 30 MIN: CPT

## 2023-08-21 NOTE — PHYSICAL EXAM
[General Appearance - Well Developed] : well developed [General Appearance - Well Nourished] : well nourished [General Appearance - In No Acute Distress] : no acute distress [Abdomen Soft] : soft [Abdomen Tenderness] : non-tender [Costovertebral Angle Tenderness] : no ~M costovertebral angle tenderness [Exaggerated Use Of Accessory Muscles For Inspiration] : no accessory muscle use [Normal Station and Gait] : the gait and station were normal for the patient's age [Oriented To Time, Place, And Person] : oriented to person, place, and time [No Focal Deficits] : no focal deficits [Urethral Meatus] : normal urethra [Urinary Bladder Findings] : the bladder was normal on palpation [External Female Genitalia] : normal external genitalia [Vagina] : normal vaginal exam [FreeTextEntry1] : trace edema

## 2023-08-21 NOTE — ASSESSMENT
[FreeTextEntry1] : Mixed urinary incontinence. Has some lifestyle contributors.  --UA, UCx --Decrease caffeine --Refer to PT --Discussed meds with anticholinergics or B agonist. Pt wishes to try caffeine decrease first and will call if desires trial of med.   remote hx of bladder ca. Very atypical pathology particularly given pts age at dx and lack of risk factors. No recurrence in many years.  --Cysto in 4y

## 2023-08-21 NOTE — HISTORY OF PRESENT ILLNESS
[FreeTextEntry1] : 64yo female with cc of bladder ca. 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 Seen by me 11/2021 at which time last cysto with Dr Sauer was 7y prior. Had cysto with me 1/2022 that was negative. No tobacco hx. No exposure hx. Dad with hx of bladder ca. At baseline, has some urinary urgency. She works as a nurse in [employer]. Tends to hold. Drinks 2 cups of coffee in the am  Pt returns today for follow-up. Pt reports increase in bother from her urinary urgency. 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 She is going about 6x in the day. Can sometimes hold for long periods (4-6h). Has nocturia x2. She reports exacerbation with EtOH and coffee. SHe reports bothersome leakage. This is both stress and urge related. She wears 1 pad in the day. Leakage is variable. Believes UUI is > SARATH. Drinks 2 cups of coffee in the am and a cup of tea in the afternoon (3-4pm).   Hx of 2 c-sections, labored for a long time and then had section with each. No pelvic surgeries. No sensation of prolapse. No issues with UTIs.

## 2023-08-22 LAB
APPEARANCE: CLEAR
BILIRUBIN URINE: NEGATIVE
BLOOD URINE: NEGATIVE
COLOR: YELLOW
GLUCOSE QUALITATIVE U: NEGATIVE MG/DL
KETONES URINE: NEGATIVE MG/DL
LEUKOCYTE ESTERASE URINE: NEGATIVE
NITRITE URINE: NEGATIVE
PH URINE: 5.5
PROTEIN URINE: NEGATIVE MG/DL
SPECIFIC GRAVITY URINE: 1.02
UROBILINOGEN URINE: 0.2 MG/DL

## 2023-09-04 RX ORDER — METHYLPREDNISOLONE 4 MG/1
4 TABLET ORAL
Qty: 1 | Refills: 0 | Status: DISCONTINUED | COMMUNITY
Start: 2023-04-12 | End: 2023-09-04

## 2023-09-06 ENCOUNTER — LABORATORY RESULT (OUTPATIENT)
Age: 63
End: 2023-09-06

## 2023-09-06 ENCOUNTER — APPOINTMENT (OUTPATIENT)
Dept: FAMILY MEDICINE | Facility: CLINIC | Age: 63
End: 2023-09-06
Payer: COMMERCIAL

## 2023-09-06 VITALS
OXYGEN SATURATION: 98 % | HEART RATE: 82 BPM | HEIGHT: 60 IN | TEMPERATURE: 97 F | BODY MASS INDEX: 35.34 KG/M2 | WEIGHT: 180 LBS

## 2023-09-06 VITALS — SYSTOLIC BLOOD PRESSURE: 136 MMHG | DIASTOLIC BLOOD PRESSURE: 78 MMHG

## 2023-09-06 DIAGNOSIS — Z00.00 ENCOUNTER FOR GENERAL ADULT MEDICAL EXAMINATION W/OUT ABNORMAL FINDINGS: ICD-10-CM

## 2023-09-06 DIAGNOSIS — Z23 ENCOUNTER FOR IMMUNIZATION: ICD-10-CM

## 2023-09-06 DIAGNOSIS — R03.0 ELEVATED BLOOD-PRESSURE READING, W/OUT DIAGNOSIS OF HYPERTENSION: ICD-10-CM

## 2023-09-06 DIAGNOSIS — R61 GENERALIZED HYPERHIDROSIS: ICD-10-CM

## 2023-09-06 DIAGNOSIS — R53.83 OTHER FATIGUE: ICD-10-CM

## 2023-09-06 DIAGNOSIS — E78.00 PURE HYPERCHOLESTEROLEMIA, UNSPECIFIED: ICD-10-CM

## 2023-09-06 DIAGNOSIS — R73.01 IMPAIRED FASTING GLUCOSE: ICD-10-CM

## 2023-09-06 DIAGNOSIS — M19.90 UNSPECIFIED OSTEOARTHRITIS, UNSPECIFIED SITE: ICD-10-CM

## 2023-09-06 DIAGNOSIS — F41.8 OTHER SPECIFIED ANXIETY DISORDERS: ICD-10-CM

## 2023-09-06 DIAGNOSIS — N39.46 MIXED INCONTINENCE: ICD-10-CM

## 2023-09-06 PROCEDURE — 99213 OFFICE O/P EST LOW 20 MIN: CPT | Mod: 25

## 2023-09-06 PROCEDURE — 36415 COLL VENOUS BLD VENIPUNCTURE: CPT

## 2023-09-06 PROCEDURE — 90715 TDAP VACCINE 7 YRS/> IM: CPT

## 2023-09-06 PROCEDURE — 90471 IMMUNIZATION ADMIN: CPT

## 2023-09-06 PROCEDURE — 99396 PREV VISIT EST AGE 40-64: CPT | Mod: 25

## 2023-09-06 NOTE — ASSESSMENT
[FreeTextEntry1] : ADRIANNA PAK is a 63 year old female here for a physical exam.  She is also here to follow up on medical issues as noted above.  BP elevated initially, and improved though still borderline elevated at end of visit. She has been off track with exercise and has gained some wgt due to hip OA pain and that may be contributing to her BP levels. She is going to work on lifestyle measures to improve BP and will monitor BP at home and if elevated will let me know so we can add BP med for now (with option to d/c in the future once she is able to lose a little wgt and has lifestyle measures more firmly in place)

## 2023-09-06 NOTE — PHYSICAL EXAM
[No Acute Distress] : no acute distress [Well Developed] : well developed [Well-Appearing] : well-appearing [Normal Sclera/Conjunctiva] : normal sclera/conjunctiva [EOMI] : extraocular movements intact [Normal Outer Ear/Nose] : the outer ears and nose were normal in appearance [Normal Oropharynx] : the oropharynx was normal [No JVD] : no jugular venous distention [No Lymphadenopathy] : no lymphadenopathy [Supple] : supple [Thyroid Normal, No Nodules] : the thyroid was normal and there were no nodules present [No Respiratory Distress] : no respiratory distress  [No Accessory Muscle Use] : no accessory muscle use [Clear to Auscultation] : lungs were clear to auscultation bilaterally [Normal Rate] : normal rate  [Regular Rhythm] : with a regular rhythm [Normal S1, S2] : normal S1 and S2 [No Carotid Bruits] : no carotid bruits [No Varicosities] : no varicosities [Pedal Pulses Present] : the pedal pulses are present [No Edema] : there was no peripheral edema [No Extremity Clubbing/Cyanosis] : no extremity clubbing/cyanosis [Soft] : abdomen soft [Non Tender] : non-tender [Non-distended] : non-distended [No Masses] : no abdominal mass palpated [No HSM] : no HSM [Normal Bowel Sounds] : normal bowel sounds [Normal Posterior Cervical Nodes] : no posterior cervical lymphadenopathy [Normal Anterior Cervical Nodes] : no anterior cervical lymphadenopathy [No Joint Swelling] : no joint swelling [Grossly Normal Strength/Tone] : grossly normal strength/tone [No Rash] : no rash [Coordination Grossly Intact] : coordination grossly intact [No Focal Deficits] : no focal deficits [Normal Gait] : normal gait [Normal Affect] : the affect was normal [Normal Insight/Judgement] : insight and judgment were intact [de-identified] : overweight BMI 34 [de-identified] : 2/6 soft systolic murmur heard LUSB with radiation to B neck

## 2023-09-06 NOTE — REVIEW OF SYSTEMS
[Hot Flashes] : hot flashes [Recent Change In Weight] : ~T recent weight change [Palpitations] : palpitations [Heartburn] : heartburn [Anxiety] : anxiety [Negative] : Heme/Lymph [Fever] : no fever [Chills] : no chills [Fatigue] : no fatigue [Chest Pain] : no chest pain [Lower Ext Edema] : no lower extremity edema [Orthopnea] : no orthopnea [Paroxysmal Nocturnal Dyspnea] : no paroxysmal nocturnal dyspnea [Abdominal Pain] : no abdominal pain [Diarrhea] : diarrhea [Vomiting] : no vomiting [Melena] : no melena [Joint Pain] : joint pain [Joint Stiffness] : joint stiffness [Muscle Pain] : no muscle pain [Back Pain] : no back pain [Insomnia] : no insomnia [Depression] : no depression [FreeTextEntry2] : hot flashes, some wgt gain over past year  [FreeTextEntry5] : occas palps, had eval with Dr. Sykes and all was benign/wnl  [FreeTextEntry7] : heartburn only rarely an issue at this point, uses Pepcid complete prn  [FreeTextEntry9] : L hip OA, seeing Dr. Ortez, see HPI [de-identified] : flight anxiety, uses diazepam prn with good effect and med is well tolerated

## 2023-09-06 NOTE — PLAN
[FreeTextEntry1] : Continue all medications as prescribed. Check labs as above. Will adjust any medications based upon lab results.  Reviewed age-appropriate preventive screening tests with patient. UTD on colonoscopy, gyn exam, mammogram, and DEXA screening.   Revd/recommended flu vacc, Tdap booster and Shingrix #2 to complete series.   We revd Coronary calcium Chest CT results. 4 mm nodules x 2 in right lung major fissure are c/w benign lymph nodes. Revd that Lymph nodes in this location are a common finding on lung imaging and are known to be benign and do not require further eval or follow up. She is a never smoker and so there is no indication to do a full Chest CT unless she is having signif pulm sxs. If she is concerned about these CT results or having signif pulm sxs I recommend she see pulm for more expert opinion/advice on these results and I am happy to make that referral if she would like. She notes that she is not having any pulm sxs.   She indicates she might want to consider a f/u Chest CT around 1 yr kourtney and if so will let me know; if feels strongly that she wishes to do CT sooner or see pulm for eval she can let me know.   BP goal is <=135/85 on average on home checks. Advised to let us know if BPs are above goal on home checks.   Lifestyle measures to optimize BP reviewed, including regular exercise, adequate sleep, Mediterranean or DASH style clean eating, mindfulness/meditation, magnesium 100-400 mg supplementation, avoid NSAIDS, stress management techniques etc.   LDL goal <=100 ideally. Reviewed risks/benefits of statin med. Recommended excellent hydration +/- co Q10 (100-400 mg daily) to decrease risk for statin related myalgias.   Discussed clean eating (eg Mediterranean style eating plan) and regular exercise/staying as physically active as possible.  Include balance exercises and strength training and core strengthening exercises for bone health and to decrease risk for falls.Concept of "motion is lotion"  Recommended Tylenol XS or Arthritis 1-2 pills BID-TID if helpful, ok to use NSAIDs sparingly with food (but revd r/b/se of NSAIDs incl CV, renal and GI and she should limit use as much as possible), regular stretching, heat/ice prn, consider turmeric supplementation, consider CBD cream or oral options, gentle yoga/chair yoga, Pilates, strengthen core muscles, consider chiro and/or massage and/or acupuncture. If these measures are not helpful enough then consider consultation with ortho and/or pain  for further eval and treatment.   Reviewed importance of good self care (e.g. meditation, yoga, adequate rest, regular exercise, magnesium, clean eating, etc.).  Revd r/b/se benzodiazepine meds and that these are intended to be used rarely/occasionally only and not regularly. Revd need to safeguard supply of med. If freq of need for med increases signif over time and is more than rare/occas pt should let us know and would consider trial of daily med (eg SSRI) at that time.  Follow up with specialists as recommended by them.   Follow up for next physical in one year. RPA visit (chol, IFG etc) and repeat fasting labs in 6 months, or if feeling well and labs are wnl and she will be seeing specialists she can defer on 6 month visit with me and just f/u in 1 yr if she is comfortable with that plan.  Additional time spent addressing new or existing problems, requiring additional work outside of the normal scope of a routine annual exam: 20 minutes.

## 2023-09-06 NOTE — HEALTH RISK ASSESSMENT
[0] : 2) Feeling down, depressed, or hopeless: Not at all (0) [PHQ-2 Negative - No further assessment needed] : PHQ-2 Negative - No further assessment needed [Never] : Never [DVD0Duppf] : 0

## 2023-09-06 NOTE — HISTORY OF PRESENT ILLNESS
[FreeTextEntry1] : ADRIANNA PAK is a 63 year old female here for a physical exam. [de-identified] : Her last physical exam was last year  Vaccines: Tetanus is NOT up to date Pneumococcal vaccination is NOT up to date, had a dose 10/2020 but did not complete series Shingrix is up to date COVID vaccine is up to date  Her last dentist visit was less than one year ago Her last eye doctor appointment was less than one year ago, Dr. Cutler  Her last dermatologist visit was less than one year ago, Dr. Valdez, has appt 12/2023  GYN visit is up to date, 6/2023 Dr. Lockhart ([place]) Mammogram is up to date, 8/2023 [facility] stable/benign mammo/US Colon cancer screening is up to date, colonoscopy 11/2022, polyp, rpt at 5yrs, Dr. Hernandez DEXA is up to date, 6/2023 normal  Her diet is healthy overall Exercise: off track due to hip OA, some walking   Molly has a history of high cholesterol, h/o IFG (though most recent labs wnl), GERD, colon polyps, and a remote h/o bladder cancer (in her 30s, s/p surg resection, JONH since), urinary incontinence (mixed type) and situational (eg flight related) anxiety, and OA.  She is up to date on follow up and recommended testing with specialists including Dr. Sykes (card), Dr. Ruby (urol), Dr. Ortez (ortho)  She had 6/2023 Coronary CT and her calcium score was 0. Tolerating Rosuvastatin 5 mg daily well.  Cardiac CT incidentally noted 2 4 mm nodules in R major lung fissure which look like lymph nodes. She is a never smoker. She is wondering if these require further eval/follow up. She grew up in a home with secondhand smoke exposure. She is wondering if she should do a Chest CT   F/U GERD/heartburn. Was able to d/c PPI med and change to H2 blocker over past year and only uses H2 blocker when needed and not needed daily.  She is doing pelvic floor PT for her urge and stress urinary incontinence. Also has reduced caffeine intake. If needed urol said she can add trial of meds but so far has not needed to do so.   Seeing Dr. Ortez re L hip pain which has recently been a signif issue. Had medrol dose pack and worked well but worse off quickly. Had US guided steroid shot which worked well but for only a week. Has been doing stretching recently. Has gained wgt and BP is higher and she is feeling tired. He told her hip is "borderline" in terms of needing surg. When she walks it feels better. Prolonged sitting makes hip worse. Uses ibuprofen sparingly but is helpful

## 2023-09-07 ENCOUNTER — TRANSCRIPTION ENCOUNTER (OUTPATIENT)
Age: 63
End: 2023-09-07

## 2023-09-07 ENCOUNTER — LABORATORY RESULT (OUTPATIENT)
Age: 63
End: 2023-09-07

## 2023-09-07 DIAGNOSIS — M25.50 PAIN IN UNSPECIFIED JOINT: ICD-10-CM

## 2023-09-07 LAB
ALBUMIN SERPL ELPH-MCNC: 4.3 G/DL
ALP BLD-CCNC: 56 U/L
ALT SERPL-CCNC: 17 U/L
ANION GAP SERPL CALC-SCNC: 12 MMOL/L
AST SERPL-CCNC: 21 U/L
BASOPHILS # BLD AUTO: 0.05 K/UL
BASOPHILS NFR BLD AUTO: 0.8 %
BILIRUB SERPL-MCNC: 0.4 MG/DL
BUN SERPL-MCNC: 11 MG/DL
CALCIUM SERPL-MCNC: 9.7 MG/DL
CHLORIDE SERPL-SCNC: 109 MMOL/L
CHOLEST SERPL-MCNC: 142 MG/DL
CO2 SERPL-SCNC: 25 MMOL/L
CREAT SERPL-MCNC: 0.74 MG/DL
EGFR: 91 ML/MIN/1.73M2
EOSINOPHIL # BLD AUTO: 0.18 K/UL
EOSINOPHIL NFR BLD AUTO: 2.7 %
ESTIMATED AVERAGE GLUCOSE: 114 MG/DL
GLUCOSE SERPL-MCNC: 101 MG/DL
HBA1C MFR BLD HPLC: 5.6 %
HCT VFR BLD CALC: 41.8 %
HDLC SERPL-MCNC: 55 MG/DL
HGB BLD-MCNC: 12.8 G/DL
IMM GRANULOCYTES NFR BLD AUTO: 0.5 %
LDLC SERPL CALC-MCNC: 73 MG/DL
LYMPHOCYTES # BLD AUTO: 2.05 K/UL
LYMPHOCYTES NFR BLD AUTO: 31.2 %
MAN DIFF?: NORMAL
MCHC RBC-ENTMCNC: 30.6 GM/DL
MCHC RBC-ENTMCNC: 30.6 PG
MCV RBC AUTO: 100 FL
MONOCYTES # BLD AUTO: 0.44 K/UL
MONOCYTES NFR BLD AUTO: 6.7 %
NEUTROPHILS # BLD AUTO: 3.82 K/UL
NEUTROPHILS NFR BLD AUTO: 58.1 %
NONHDLC SERPL-MCNC: 88 MG/DL
PLATELET # BLD AUTO: 202 K/UL
POTASSIUM SERPL-SCNC: 4.5 MMOL/L
PROT SERPL-MCNC: 6.6 G/DL
RBC # BLD: 4.18 M/UL
RBC # FLD: 13.5 %
SODIUM SERPL-SCNC: 146 MMOL/L
TRIGL SERPL-MCNC: 78 MG/DL
WBC # FLD AUTO: 6.57 K/UL

## 2023-09-08 ENCOUNTER — TRANSCRIPTION ENCOUNTER (OUTPATIENT)
Age: 63
End: 2023-09-08

## 2023-09-27 ENCOUNTER — APPOINTMENT (OUTPATIENT)
Dept: MRI IMAGING | Facility: CLINIC | Age: 63
End: 2023-09-27
Payer: COMMERCIAL

## 2023-09-27 ENCOUNTER — RESULT REVIEW (OUTPATIENT)
Age: 63
End: 2023-09-27

## 2023-09-27 ENCOUNTER — OUTPATIENT (OUTPATIENT)
Dept: OUTPATIENT SERVICES | Facility: HOSPITAL | Age: 63
LOS: 1 days | End: 2023-09-27
Payer: COMMERCIAL

## 2023-09-27 DIAGNOSIS — M16.12 UNILATERAL PRIMARY OSTEOARTHRITIS, LEFT HIP: ICD-10-CM

## 2023-09-27 DIAGNOSIS — Z98.890 OTHER SPECIFIED POSTPROCEDURAL STATES: Chronic | ICD-10-CM

## 2023-09-27 PROCEDURE — 73721 MRI JNT OF LWR EXTRE W/O DYE: CPT | Mod: 26,LT

## 2023-09-27 PROCEDURE — 73721 MRI JNT OF LWR EXTRE W/O DYE: CPT

## 2023-10-16 NOTE — H&P PST ADULT - TOBACCO USE
[address] [address] [employer] [facility], [address]  PHONE: [phone]    SUBJECTIVE: /HPI  Jennifer Hung (1950) is a 68 y.o. male seen for a follow up visit regarding the following:   Specialty Problems          Cardiology Problems    Acute embolism and thrombosis of right peroneal vein ([address])        Aortic stenosis        Hypertension        Systolic CHF, chronic ([address])        Atherosclerotic heart disease of native coronary artery with other forms of angina pectoris [facility])        NSTEMI (non-ST elevated myocardial infarction) [facility])         Kelin Kang is a 70-year-old male who presents to the office for a 6 month f/u evaluation. At his last visit, he was restarted on a Lipitor. TTE on 11/06/22 showed EF of 10-15%. Today, he states Pt doing well. No chest pain. No palpitations. Patient denies syncope. No dyspnea. States they are taking meds. Maintains a normal level of activity for them without symptoms. No dizziness or lightheadedness. All above conditions stable. Patient is on optimize medical therapy we will repeat an echo and if EF is still below 35% electrophysiology consult for consideration ofICD therapy or depending on his QRS duration CRT therapy CRT-D therapy    10/16/23  Pt doing well. No chest pain. No palpitations. Patient denies syncope. No dyspnea. States they are taking meds. Maintains a normal level of activity for them without symptoms. No dizziness or lightheadedness. All above conditions stable. Patient has seen electrophysiology and it appears as though they are recommending consideration for CRT-D therapy  Recent echo corroborates ejection fraction 15 to 20% with a bioprosthetic aortic valve in place mean gradient 14 mmHg moderately dilated left atrium ascending aorta 4 cm. Patient endorses that he believes he has his BiV ICD coming up later this week  .        Past Medical History, Past Surgical History, Family history, Social History, and Medications were all
Never smoker

## 2023-10-31 ENCOUNTER — OFFICE (OUTPATIENT)
Dept: URBAN - METROPOLITAN AREA CLINIC 102 | Facility: CLINIC | Age: 63
Setting detail: OPHTHALMOLOGY
End: 2023-10-31
Payer: COMMERCIAL

## 2023-10-31 DIAGNOSIS — H01.002: ICD-10-CM

## 2023-10-31 DIAGNOSIS — H02.822: ICD-10-CM

## 2023-10-31 DIAGNOSIS — H40.033: ICD-10-CM

## 2023-10-31 DIAGNOSIS — H01.005: ICD-10-CM

## 2023-10-31 DIAGNOSIS — H01.004: ICD-10-CM

## 2023-10-31 DIAGNOSIS — H01.001: ICD-10-CM

## 2023-10-31 DIAGNOSIS — H25.13: ICD-10-CM

## 2023-10-31 DIAGNOSIS — H02.825: ICD-10-CM

## 2023-10-31 PROBLEM — H16.223 DRY EYE SYNDROME K SICCA; BOTH EYES: Status: ACTIVE | Noted: 2023-10-31

## 2023-10-31 PROCEDURE — 92020 GONIOSCOPY: CPT | Performed by: OPHTHALMOLOGY

## 2023-10-31 PROCEDURE — 92014 COMPRE OPH EXAM EST PT 1/>: CPT | Performed by: OPHTHALMOLOGY

## 2023-10-31 ASSESSMENT — REFRACTION_CURRENTRX
OD_CYLINDER: SPHERE
OD_VPRISM_DIRECTION: SV
OS_CYLINDER: SPHERE
OS_AXIS: 0
OD_SPHERE: +2.00
OS_VPRISM_DIRECTION: SV
OD_AXIS: 0
OS_SPHERE: +2.00
OS_OVR_VA: 20/
OD_OVR_VA: 20/

## 2023-10-31 ASSESSMENT — VISUAL ACUITY
OS_BCVA: 20/25-1
OD_BCVA: 20/20

## 2023-10-31 ASSESSMENT — REFRACTION_AUTOREFRACTION
OD_CYLINDER: -0.25
OS_CYLINDER: -0.25
OD_SPHERE: -0.50
OS_AXIS: 093
OD_AXIS: 051
OS_SPHERE: 0.00

## 2023-10-31 ASSESSMENT — SPHEQUIV_DERIVED
OS_SPHEQUIV: -0.125
OD_SPHEQUIV: -0.625

## 2023-10-31 ASSESSMENT — LID EXAM ASSESSMENTS
OD_BLEPHARITIS: RLL RUL T
OS_BLEPHARITIS: LLL LUL T

## 2023-10-31 ASSESSMENT — TONOMETRY
OD_IOP_MMHG: 16
OS_IOP_MMHG: 18

## 2023-10-31 ASSESSMENT — CONFRONTATIONAL VISUAL FIELD TEST (CVF)
OS_FINDINGS: FULL
OD_FINDINGS: FULL

## 2023-12-14 ENCOUNTER — APPOINTMENT (OUTPATIENT)
Dept: DERMATOLOGY | Facility: CLINIC | Age: 63
End: 2023-12-14
Payer: COMMERCIAL

## 2023-12-14 DIAGNOSIS — Z12.83 ENCOUNTER FOR SCREENING FOR MALIGNANT NEOPLASM OF SKIN: ICD-10-CM

## 2023-12-14 DIAGNOSIS — L82.1 OTHER SEBORRHEIC KERATOSIS: ICD-10-CM

## 2023-12-14 DIAGNOSIS — L81.4 OTHER MELANIN HYPERPIGMENTATION: ICD-10-CM

## 2023-12-14 PROCEDURE — 99213 OFFICE O/P EST LOW 20 MIN: CPT

## 2023-12-14 NOTE — PHYSICAL EXAM
[Alert] : alert [Oriented x 3] : ~L oriented x 3 [Well Nourished] : well nourished [Conjunctiva Non-injected] : conjunctiva non-injected [No Visual Lymphadenopathy] : no visual  lymphadenopathy [No Clubbing] : no clubbing [No Edema] : no edema [No Bromhidrosis] : no bromhidrosis [No Chromhidrosis] : no chromhidrosis [Full Body Skin Exam Performed] : performed [FreeTextEntry3] : General: Alert and oriented, in NAD.  All of the following were examined and were within normal limits, except as noted:   Scalp: Face, including eyelids, nose, lips, ears, oropharynx: Neck: Chest/Back/Abdomen: Bilateral Arms/Hands: Bilateral Legs/Feet: Buttocks, Genitalia, Anus/perineum:  	 Hair, Oral Mucosa, Eyes:   Unable to examine nails due to polish - dilated tele's on bl cheeks - light brown macules diffusely on sun exposed areas - brown homogenous macules and papules on body - stuckon waxy brown papules on scalp, face, body - cystic papules on labia majora, and larger nodule on L labia majora

## 2023-12-14 NOTE — HISTORY OF PRESENT ILLNESS
[FreeTextEntry1] : spots [de-identified] : ADRIANNA PAK is a 63 year old F who present for f/u as below.   #Blackheads on cheeks #FBSE. Spots scattered on body x years. Asymptomatic and unchanged. No alleviating/aggravating factors. Never been treated.  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

## 2023-12-14 NOTE — ASSESSMENT
[FreeTextEntry1] : Solar lentigines - Discussed etiology and benign nature of condition - Sun protective measures reinforced. Recommended OTC sunscreen products, including SPF30+ with broadband UV protection as well as proper use.  Seborrheic Keratosis - These growths are benign - Related to genetics - these lesions run in families; NOT related to sun exposure - No treatment warranted unless inflamed; can use OTC Sarna lotion PRN itch  Vulvar cysts - Benign condition discussed with patient - No further treatment indicated at this time  Screening exam for skin cancer - no suspicious lesions on exam today - TBSE performed today - Advised sun protection.  Recommended OTC sunscreen products (EltaMD/Neutrogena/La Roche Posay), including SPF30+ with broadband UV protection as well as proper use.  Discussed OTC sun protective clothing - Counseled patient to monitor for changes, including mole monitoring and self-skin exams  Encounter for skin care - Recommend SPF moisturizer in AM.  - Recommend Neutrogena Rapid Wrinkle Repair or JEANIE retinol in PM

## 2023-12-18 ENCOUNTER — NON-APPOINTMENT (OUTPATIENT)
Age: 63
End: 2023-12-18

## 2023-12-20 ENCOUNTER — APPOINTMENT (OUTPATIENT)
Dept: NEUROLOGY | Facility: CLINIC | Age: 63
End: 2023-12-20
Payer: COMMERCIAL

## 2023-12-20 VITALS
WEIGHT: 178 LBS | DIASTOLIC BLOOD PRESSURE: 95 MMHG | HEIGHT: 60 IN | TEMPERATURE: 97.8 F | SYSTOLIC BLOOD PRESSURE: 155 MMHG | HEART RATE: 84 BPM | BODY MASS INDEX: 34.95 KG/M2

## 2023-12-20 DIAGNOSIS — R41.840 ATTENTION AND CONCENTRATION DEFICIT: ICD-10-CM

## 2023-12-20 DIAGNOSIS — R20.8 OTHER DISTURBANCES OF SKIN SENSATION: ICD-10-CM

## 2023-12-20 DIAGNOSIS — G24.5 BLEPHAROSPASM: ICD-10-CM

## 2023-12-20 DIAGNOSIS — R25.1 TREMOR, UNSPECIFIED: ICD-10-CM

## 2023-12-20 DIAGNOSIS — G43.109 MIGRAINE WITH AURA, NOT INTRACTABLE, W/OUT STATUS MIGRAINOSUS: ICD-10-CM

## 2023-12-20 PROCEDURE — 99215 OFFICE O/P EST HI 40 MIN: CPT

## 2023-12-20 PROCEDURE — 95816 EEG AWAKE AND DROWSY: CPT

## 2023-12-20 RX ORDER — DIAZEPAM 5 MG/1
5 TABLET ORAL
Qty: 30 | Refills: 0 | Status: DISCONTINUED | COMMUNITY
Start: 1900-01-01 | End: 2023-12-20

## 2023-12-20 RX ORDER — FAMOTIDINE 20 MG/1
20 TABLET, FILM COATED ORAL
Refills: 0 | Status: DISCONTINUED | COMMUNITY
End: 2023-12-20

## 2023-12-20 RX ORDER — MELOXICAM 7.5 MG/1
7.5 TABLET ORAL
Qty: 30 | Refills: 2 | Status: DISCONTINUED | COMMUNITY
Start: 2023-04-12 | End: 2023-12-20

## 2023-12-20 NOTE — PHYSICAL EXAM
[General Appearance - Alert] : alert [General Appearance - In No Acute Distress] : in no acute distress [Oriented To Time, Place, And Person] : oriented to person, place, and time [Mood] : the mood was normal [Person] : oriented to person [Place] : oriented to place [Time] : oriented to time [Registration Intact] : recent registration memory intact [Concentration Intact] : normal concentrating ability [Naming Objects] : no difficulty naming common objects [Repeating Phrases] : no difficulty repeating a phrase [Fluency] : fluency intact [Comprehension] : comprehension intact [Past History] : adequate knowledge of personal past history [Cranial Nerves Optic (II)] : visual acuity intact bilaterally,  visual fields full to confrontation, pupils equal round and reactive to light [Cranial Nerves Oculomotor (III)] : extraocular motion intact [Cranial Nerves Trigeminal (V)] : facial sensation intact symmetrically [Cranial Nerves Facial (VII)] : face symmetrical [Cranial Nerves Vestibulocochlear (VIII)] : hearing was intact bilaterally [Cranial Nerves Glossopharyngeal (IX)] : tongue and palate midline [Cranial Nerves Accessory (XI - Cranial And Spinal)] : head turning and shoulder shrug symmetric [Cranial Nerves Hypoglossal (XII)] : there was no tongue deviation with protrusion [Motor Tone] : muscle tone was normal in all four extremities [Motor Strength] : muscle strength was normal in all four extremities [No Muscle Atrophy] : normal bulk in all four extremities [Sensation Tactile Decrease] : light touch was intact [Balance] : balance was intact [2+] : Patella left 2+ [1+] : Ankle jerk left 1+ [PERRL With Normal Accommodation] : pupils were equal in size, round, reactive to light, with normal accommodation [Extraocular Movements] : extraocular movements were intact [Full Visual Field] : full visual field [Hearing Threshold Finger Rub Not Wagoner] : hearing was normal [Neck Cervical Mass (___cm)] : no neck mass was observed [Auscultation Breath Sounds / Voice Sounds] : lungs were clear to auscultation bilaterally [Heart Rate And Rhythm] : heart rate was normal and rhythm regular [Arterial Pulses Carotid] : carotid pulses were normal with no bruits [Edema] : there was no peripheral edema [Abnormal Walk] : normal gait [] : no rash [Past-pointing] : there was no past-pointing [Tremor] : no tremor present [Plantar Reflex Right Only] : normal on the right [Plantar Reflex Left Only] : normal on the left [FreeTextEntry1] : No blepharospasm

## 2023-12-20 NOTE — HISTORY OF PRESENT ILLNESS
[FreeTextEntry1] : Patient is here for a follow-up visit today, last telehealth visit on 1/5/2021.  Patient reports that she continues to have visual phenomena, starts like kaleidoscopic lights in one eye, it lasts about 15 minutes, is not followed by headache, these phenomena occur about twice a month, she has not noted any change in the pattern.  Patient does however report that she has noted total resolution of blepharospasm, she however complains of intermittent tingling sensation in the scalp, it is migratory, at times frontally, at other times occipital or temporal, there is no associated jaw claudication, visual blurring or orbital pain.    Patient also brings to my attention that occasionally she feels she loses focus/concentration and zones out, she has not noted associated loss of consciousness or auto matters him or symptoms suggestive of aura

## 2023-12-20 NOTE — DISCUSSION/SUMMARY
[FreeTextEntry1] : 63 year-old F with PMHx of mild hyperlipidemia, GERD, with c/o of intermittent visual phenomena-kaleidoscopic vision in the right eye, right eyelid twitching, occasional dizziness and right periorbital pain and pressure.  # Migraine aura/ocular migraine :  1-2 per month  - As Visual phenomena are not followed by headache or ocular pain and not impair functioning or QOL, I have recommended monitoring closely, maintaining a log  # Scalp dysesthesias, last ESR 13  - Will order blood work to rule out inflammatory autoimmune causes  # Episodes of decreased concentration/zoning out; rule out remote possibility of seizure  - Will obtain routine EEG  # Right > left blepharospasm - resolved

## 2023-12-20 NOTE — DATA REVIEWED
[No studies available for review at this time.] : No studies available for review at this time. [de-identified] : 10/17/20: MRI of the brain, it showed no evidence of acute stroke, mass or hydrocephalus.\par  \par   [de-identified] : 10/20/20: Labs  ESR 17, CRP 0.74\par  8/12/20: CBC, CMP normal, A1c 5.6, ferritin 233, TSH 2.7, total chol 223, HDL 43, triglycer 142,

## 2023-12-20 NOTE — REVIEW OF SYSTEMS
[As Noted in HPI] : as noted in HPI [Negative] : Heme/Lymph [Tingling] : tingling [Decr. Concentrating Ability] : decreased concentrating ability [Numbness] : no numbness [Dizziness] : no dizziness [de-identified] : occ armando out [FreeTextEntry4] : tinnitus

## 2023-12-21 ENCOUNTER — NON-APPOINTMENT (OUTPATIENT)
Age: 63
End: 2023-12-21

## 2024-01-02 ENCOUNTER — NON-APPOINTMENT (OUTPATIENT)
Age: 64
End: 2024-01-02

## 2024-01-02 LAB
ACE BLD-CCNC: 21 U/L
ANA SER IF-ACNC: NEGATIVE
CK SERPL-CCNC: 62 U/L
ENA SS-A AB SER IA-ACNC: <0.2 AL
ENA SS-B AB SER IA-ACNC: <0.2 AL
FOLATE SERPL-MCNC: 7.6 NG/ML
METHYLMALONATE SERPL-SCNC: 197 NMOL/L
RHEUMATOID FACT SER QL: <10 IU/ML
VIT B12 SERPL-MCNC: 298 PG/ML

## 2024-01-03 LAB
ALBUMIN MFR SERPL ELPH: 60.2 %
ALBUMIN SERPL-MCNC: 3.9 G/DL
ALBUMIN/GLOB SERPL: 1.6 RATIO
ALPHA1 GLOB MFR SERPL ELPH: 4.3 %
ALPHA1 GLOB SERPL ELPH-MCNC: 0.3 G/DL
ALPHA2 GLOB MFR SERPL ELPH: 11.2 %
ALPHA2 GLOB SERPL ELPH-MCNC: 0.7 G/DL
B-GLOBULIN MFR SERPL ELPH: 12 %
B-GLOBULIN SERPL ELPH-MCNC: 0.8 G/DL
DEPRECATED KAPPA LC FREE/LAMBDA SER: 1.38 RATIO
GAMMA GLOB FLD ELPH-MCNC: 0.8 G/DL
GAMMA GLOB MFR SERPL ELPH: 12.3 %
IGA SER QL IEP: 223 MG/DL
IGG SER QL IEP: 771 MG/DL
IGM SER QL IEP: 200 MG/DL
INTERPRETATION SERPL IEP-IMP: NORMAL
KAPPA LC CSF-MCNC: 0.76 MG/DL
KAPPA LC SERPL-MCNC: 1.05 MG/DL
M PROTEIN SPEC IFE-MCNC: NORMAL
PROT SERPL-MCNC: 6.4 G/DL
PROT SERPL-MCNC: 6.4 G/DL

## 2024-01-31 NOTE — ASU DISCHARGE PLAN (ADULT/PEDIATRIC). - RETURN TO WORK
RAPID RESPONSE NURSE PROACTIVE ROUNDING NOTE       Time of Visit:     Admit Date: 2024  LOS: 3  Code Status: DNR   Date of Visit: 2024  : 1956  Age: 67 y.o.  Sex: female  Race: Black or   Bed: 82384/61165 A:   MRN: [id number]  Was the patient discharged from an ICU this admission? No   Was the patient discharged from a PACU within last 24 hours? No   Did the patient receive conscious sedation/general anesthesia in last 24 hours? No  Was the patient in the ED within the past 24 hours? No  Was the patient on NIPPV within the past 24 hours? No   Attending Physician: Ender Mcclure MD  Primary Service: [facility] HOSP MED D   Time spent at the bedside: 15 -30 min    SITUATION    Notified by pager.  Reason for alert: Hypotension  Called to evaluate the patient for Circulatory    BACKGROUND     Why is the patient in the hospital?: Failure to thrive in adult    Patient has a past medical history of Anxiety, Asthma, Bronchitis, COPD (chronic obstructive pulmonary disease), Depression, GERD (gastroesophageal reflux disease), Hallucination, History of psychiatric hospitalization, Hypertension, Neck pain, Polyneuropathy in diseases classified elsewhere, Schizophrenia, and Sleep difficulties.    Last Vitals:  Temp: 98.5 °F (36.9 °C) ( 1300)  Pulse: 104 (1655)  Resp: 17 (1655)  BP: 82/43 (1655)  SpO2: 99 % (1655)    24 Hours Vitals Range:  Temp:  [98.3 °F (36.8 °C)-98.5 °F (36.9 °C)]   Pulse:  []   Resp:  [16-20]   BP: ()/(43-83)   SpO2:  [98 %-100 %]     Labs:  Recent Labs     24  1757 24  0721 24  0607   WBC 5.17 3.74* 2.66*   HGB 9.6* 9.7* 10.6*   HCT 26.9* 29.5* 33.1*   * 139* 136*       Recent Labs     24  0757 24  0721 24  1916 24  0607    146* 147* 144   K 3.9 3.7 4.1 3.6   * 114* 115* 109   CO2 20* 16* 16* 19*   BUN 51* 47* 47* 45*   CREATININE 2.9* 2.9* 2.7* 2.8*   * 94 92 65*  "  PHOS  --   --  3.0  --    MG 1.8 1.7  --  1.6        No results for input(s): "PH", "PCO2", "PO2", "HCO3", "POCSATURATED", "BE" in the last 72 hours.     ASSESSMENT    Physical Exam  Vitals and nursing note reviewed.   Constitutional:       Appearance: She is cachectic. She is ill-appearing.      Interventions: Nasal cannula in place.   Neurological:      Mental Status: She is lethargic.      GCS: GCS eye subscore is 2. GCS verbal subscore is 3. GCS motor subscore is 5.     Patient hypotensive and somnolent    INTERVENTIONS    The patient was seen for Neurological problem. Staff concerns included mental status change. The following interventions were performed: POCT glucose and D10 bolus 250 ml IV bolus.    RECOMMENDATIONS    -GOC discussion with family.  -Maintain IV access      PROVIDER ESCALATION    Yes/No  Yes    Orders received and case discussed with Dr. Mcclure .    Disposition: Remain in room 87810.    FOLLOW-UP    charge Geneva RANGEL  updated on plan of care. Instructed to call the Rapid Response Nurse, Bryon Nolasco RN at [phone] for additional questions or concerns.           " No

## 2024-02-05 RX ORDER — ROSUVASTATIN CALCIUM 5 MG/1
5 TABLET, FILM COATED ORAL
Qty: 90 | Refills: 3 | Status: ACTIVE | COMMUNITY
Start: 2022-01-26 | End: 1900-01-01

## 2024-02-07 ENCOUNTER — RX RENEWAL (OUTPATIENT)
Age: 64
End: 2024-02-07

## 2024-05-15 ENCOUNTER — APPOINTMENT (OUTPATIENT)
Dept: MRI IMAGING | Facility: CLINIC | Age: 64
End: 2024-05-15
Payer: COMMERCIAL

## 2024-05-15 ENCOUNTER — OUTPATIENT (OUTPATIENT)
Dept: OUTPATIENT SERVICES | Facility: HOSPITAL | Age: 64
LOS: 1 days | End: 2024-05-15
Payer: COMMERCIAL

## 2024-05-15 ENCOUNTER — RESULT REVIEW (OUTPATIENT)
Age: 64
End: 2024-05-15

## 2024-05-15 ENCOUNTER — APPOINTMENT (OUTPATIENT)
Dept: ORTHOPEDIC SURGERY | Facility: CLINIC | Age: 64
End: 2024-05-15
Payer: COMMERCIAL

## 2024-05-15 VITALS — HEIGHT: 60 IN | WEIGHT: 170 LBS | BODY MASS INDEX: 33.38 KG/M2

## 2024-05-15 DIAGNOSIS — Z00.8 ENCOUNTER FOR OTHER GENERAL EXAMINATION: ICD-10-CM

## 2024-05-15 DIAGNOSIS — M70.71 OTHER BURSITIS OF HIP, RIGHT HIP: ICD-10-CM

## 2024-05-15 DIAGNOSIS — M16.11 UNILATERAL PRIMARY OSTEOARTHRITIS, RIGHT HIP: ICD-10-CM

## 2024-05-15 DIAGNOSIS — Z98.890 OTHER SPECIFIED POSTPROCEDURAL STATES: Chronic | ICD-10-CM

## 2024-05-15 PROCEDURE — 73721 MRI JNT OF LWR EXTRE W/O DYE: CPT | Mod: 26,RT

## 2024-05-15 PROCEDURE — 99203 OFFICE O/P NEW LOW 30 MIN: CPT

## 2024-05-15 PROCEDURE — 73721 MRI JNT OF LWR EXTRE W/O DYE: CPT

## 2024-05-15 PROCEDURE — 73502 X-RAY EXAM HIP UNI 2-3 VIEWS: CPT

## 2024-05-18 PROBLEM — M16.11 PRIMARY OSTEOARTHRITIS OF RIGHT HIP: Status: ACTIVE | Noted: 2024-05-18

## 2024-05-18 NOTE — ASSESSMENT
[FreeTextEntry1] : The patient is a 62 yo woman presenting with right hip pain without new trauma. She reports pain to the groin that is constant and moderate to severe with use. She has pain when moving quickly and pain is worsening overall. She has pain getting on socks and shoes and when getting in and out of the car. She uses aleve and tylenol as needed. She denies pain with stairs. She has pain at night as well. She has tried prednisone previously without relief.   Right hip exam: The patient presents in no apparent distress. Neurovascularly intact. Sensation intact to right lower extremity. No scars, cuts or abrasions. 2+ pulses to posterior tibialis. ROM is full and painless. + abductor tenderness. + groin pain. - lateral hip tenderness. - radiculopathy. + straight leg raise with pain. 5/5 abductor strength. +pain with forced ER/IR.  X-rays done in the office today of the right hip 2 views and the AP pelvis show mild central wear on the right hip consistent with early arthritis..  She has more significant arthritis in the left hip.  There are no obvious tumors, masses or calcifications seen.  The plan at this time is an MRI of the right hip to rule out a labral tear or more severe arthritis or avascular necrosis.  Will consider an intra-articular injection of the right hip under fluoroscopy.  She had 1 in her left hip for arthritis in the past which helped dramatically.  I will see her back in the office as needed.  She will take anti-inflammatories as needed.

## 2024-05-18 NOTE — HISTORY OF PRESENT ILLNESS
[Sudden] : sudden [] : yes [Meds] : meds [Bending forward] : bending forward [FreeTextEntry5] : 64 y/o F presents for NI eval of the Rt. hip today. Pt reports of hip pain with no associated trauma. Decreased mobility. Aleve or Tylenol as needed.  [FreeTextEntry7] : groin and occasionally down to Rt. knee  [de-identified] : crossing legs

## 2024-05-21 RX ORDER — IBUPROFEN 600 MG/1
600 TABLET, FILM COATED ORAL
Qty: 60 | Refills: 2 | Status: ACTIVE | COMMUNITY
Start: 2024-05-21 | End: 1900-01-01

## 2024-06-05 ENCOUNTER — APPOINTMENT (OUTPATIENT)
Dept: ULTRASOUND IMAGING | Facility: CLINIC | Age: 64
End: 2024-06-05
Payer: COMMERCIAL

## 2024-06-05 ENCOUNTER — OUTPATIENT (OUTPATIENT)
Dept: OUTPATIENT SERVICES | Facility: HOSPITAL | Age: 64
LOS: 1 days | End: 2024-06-05
Payer: COMMERCIAL

## 2024-06-05 ENCOUNTER — RESULT REVIEW (OUTPATIENT)
Age: 64
End: 2024-06-05

## 2024-06-05 DIAGNOSIS — Z98.890 OTHER SPECIFIED POSTPROCEDURAL STATES: Chronic | ICD-10-CM

## 2024-06-05 DIAGNOSIS — M16.12 UNILATERAL PRIMARY OSTEOARTHRITIS, LEFT HIP: ICD-10-CM

## 2024-06-05 DIAGNOSIS — M16.11 UNILATERAL PRIMARY OSTEOARTHRITIS, RIGHT HIP: ICD-10-CM

## 2024-06-05 DIAGNOSIS — Z00.8 ENCOUNTER FOR OTHER GENERAL EXAMINATION: ICD-10-CM

## 2024-06-05 PROCEDURE — 20611 DRAIN/INJ JOINT/BURSA W/US: CPT | Mod: LT

## 2024-06-05 PROCEDURE — 20611 DRAIN/INJ JOINT/BURSA W/US: CPT

## 2024-07-02 RX ORDER — PREDNISONE 10 MG/1
10 TABLET ORAL
Qty: 10 | Refills: 0 | Status: ACTIVE | COMMUNITY
Start: 2024-07-02 | End: 1900-01-01

## 2024-07-09 ENCOUNTER — NON-APPOINTMENT (OUTPATIENT)
Age: 64
End: 2024-07-09

## 2024-07-09 ENCOUNTER — APPOINTMENT (OUTPATIENT)
Dept: ORTHOPEDIC SURGERY | Facility: CLINIC | Age: 64
End: 2024-07-09
Payer: COMMERCIAL

## 2024-07-09 VITALS
DIASTOLIC BLOOD PRESSURE: 83 MMHG | HEART RATE: 66 BPM | HEIGHT: 60 IN | BODY MASS INDEX: 33.38 KG/M2 | SYSTOLIC BLOOD PRESSURE: 169 MMHG | WEIGHT: 170 LBS

## 2024-07-09 DIAGNOSIS — M16.12 UNILATERAL PRIMARY OSTEOARTHRITIS, LEFT HIP: ICD-10-CM

## 2024-07-09 DIAGNOSIS — M16.11 UNILATERAL PRIMARY OSTEOARTHRITIS, RIGHT HIP: ICD-10-CM

## 2024-07-09 PROCEDURE — 99214 OFFICE O/P EST MOD 30 MIN: CPT

## 2024-08-16 ENCOUNTER — APPOINTMENT (OUTPATIENT)
Dept: ORTHOPEDIC SURGERY | Facility: CLINIC | Age: 64
End: 2024-08-16
Payer: COMMERCIAL

## 2024-08-16 VITALS — BODY MASS INDEX: 33.38 KG/M2 | HEIGHT: 60 IN | WEIGHT: 170 LBS

## 2024-08-16 DIAGNOSIS — S83.206A UNSPECIFIED TEAR OF UNSPECIFIED MENISCUS, CURRENT INJURY, RIGHT KNEE, INITIAL ENCOUNTER: ICD-10-CM

## 2024-08-16 PROCEDURE — 20610 DRAIN/INJ JOINT/BURSA W/O US: CPT | Mod: RT

## 2024-08-16 PROCEDURE — 73564 X-RAY EXAM KNEE 4 OR MORE: CPT | Mod: RT

## 2024-08-16 PROCEDURE — 99213 OFFICE O/P EST LOW 20 MIN: CPT | Mod: 25

## 2024-08-19 NOTE — ASSESSMENT
[FreeTextEntry1] : The patient is here for follow up right hip and knee pain. She reports no new trauma to either but has had significantly increased right knee pain, likely compensating for her right hip. She reports her last right hip IA injection lasted perfectly for two weeks. She reports pain to the groin that is constant and moderate to severe with use. She has pain when moving quickly and pain is worsening overall. She has pain getting on socks and shoes and when getting in and out of the car. She uses aleve and tylenol as needed. She denies pain with stairs. She has pain at night as well. She has tried prednisone previously without relief.   Right hip exam: The patient presents in no apparent distress. Neurovascularly intact. Sensation intact to right lower extremity. No scars, cuts or abrasions. 2+ pulses to posterior tibialis. ROM is full and painless. + abductor tenderness. + groin pain. - lateral hip tenderness. - radiculopathy. + straight leg raise with pain. 5/5 abductor strength. +pain with forced ER/IR.  Right knee exam: Well appearing female in no apparent distress. No rashes, scars, or abrasions. Neurovascularly intact. Tender to palpation at medial joint line, mild in nature. Ranging from 0-125. No varus/valgus deformity. Anterior/posterior drawer negative, - Mcmurrays. - Lachmans.   Right knee xrays taken today in office, 4 views WB - No OA noted. No fractures or loose bodies. No obvious tumors, masses, or lesions.  The patient received a right knee CSI. She tolerated it well. We will discuss with Dr. Ortez about a right hip VASU if not better. She will return in two weeks to discuss her right hip further.

## 2024-08-19 NOTE — PROCEDURE
[Large Joint Injection] : Large joint injection [Right] : of the right [Knee] : knee [Pain] : pain [Inflammation] : inflammation [Alcohol] : alcohol [Betadine] : betadine [Ethyl Chloride sprayed topically] : ethyl chloride sprayed topically [Sterile technique used] : sterile technique used [___ cc    1%] : Lidocaine ~Vcc of 1%  [___ cc    0.25%] : Bupivacaine (Marcaine) ~Vcc of 0.25%  [___ cc    80mg] : Methylprednisolone (Depomedrol) ~Vcc of 80 mg  [] : Patient tolerated procedure well [Call if redness, pain or fever occur] : call if redness, pain or fever occur [Previous OTC use and PT nontherapeutic] : patient has tried OTC's including aspirin, Ibuprofen, Aleve, etc or prescription NSAIDS, and/or exercises at home and/or physical therapy without satisfactory response [Patient had decreased mobility in the joint] : patient had decreased mobility in the joint [Risks, benefits, alternatives discussed / Verbal consent obtained] : the risks benefits, and alternatives have been discussed, and verbal consent was obtained

## 2024-08-19 NOTE — HISTORY OF PRESENT ILLNESS
[10] : 10 [Dull/Aching] : dull/aching [Localized] : localized [Constant] : constant [Standing] : standing [Walking] : walking [Stairs] : stairs [Full time] : Work status: full time [] : Post Surgical Visit: no [FreeTextEntry1] : Right knee [FreeTextEntry5] : 63 y/o patient complaining of right knee pain for the past 10 days, no specific injury, no prior hx.

## 2024-08-19 NOTE — HISTORY OF PRESENT ILLNESS
[10] : 10 [Dull/Aching] : dull/aching [Localized] : localized [Constant] : constant [Standing] : standing [Walking] : walking [Stairs] : stairs [Full time] : Work status: full time [] : Post Surgical Visit: no [FreeTextEntry1] : Right knee [FreeTextEntry5] : 65 y/o patient complaining of right knee pain for the past 10 days, no specific injury, no prior hx.

## 2024-08-21 ENCOUNTER — APPOINTMENT (OUTPATIENT)
Dept: MAMMOGRAPHY | Facility: CLINIC | Age: 64
End: 2024-08-21
Payer: COMMERCIAL

## 2024-08-21 ENCOUNTER — OUTPATIENT (OUTPATIENT)
Dept: OUTPATIENT SERVICES | Facility: HOSPITAL | Age: 64
LOS: 1 days | End: 2024-08-21
Payer: COMMERCIAL

## 2024-08-21 ENCOUNTER — APPOINTMENT (OUTPATIENT)
Dept: ULTRASOUND IMAGING | Facility: CLINIC | Age: 64
End: 2024-08-21
Payer: COMMERCIAL

## 2024-08-21 DIAGNOSIS — Z98.890 OTHER SPECIFIED POSTPROCEDURAL STATES: Chronic | ICD-10-CM

## 2024-08-21 DIAGNOSIS — Z00.8 ENCOUNTER FOR OTHER GENERAL EXAMINATION: ICD-10-CM

## 2024-08-21 PROCEDURE — 76641 ULTRASOUND BREAST COMPLETE: CPT

## 2024-08-21 PROCEDURE — 77067 SCR MAMMO BI INCL CAD: CPT | Mod: 26

## 2024-08-21 PROCEDURE — 77063 BREAST TOMOSYNTHESIS BI: CPT | Mod: 26

## 2024-08-21 PROCEDURE — 77063 BREAST TOMOSYNTHESIS BI: CPT

## 2024-08-21 PROCEDURE — 77067 SCR MAMMO BI INCL CAD: CPT

## 2024-08-21 PROCEDURE — 76641 ULTRASOUND BREAST COMPLETE: CPT | Mod: 26,50

## 2024-08-28 ENCOUNTER — APPOINTMENT (OUTPATIENT)
Dept: ORTHOPEDIC SURGERY | Facility: CLINIC | Age: 64
End: 2024-08-28

## 2024-08-28 RX ORDER — TRAMADOL HYDROCHLORIDE 50 MG/1
50 TABLET, COATED ORAL
Qty: 42 | Refills: 0 | Status: ACTIVE | COMMUNITY
Start: 2024-08-28 | End: 1900-01-01

## 2024-09-17 DIAGNOSIS — R79.89 OTHER SPECIFIED ABNORMAL FINDINGS OF BLOOD CHEMISTRY: ICD-10-CM

## 2024-09-17 DIAGNOSIS — R79.82 ELEVATED C-REACTIVE PROTEIN (CRP): ICD-10-CM

## 2024-09-23 ENCOUNTER — APPOINTMENT (OUTPATIENT)
Dept: FAMILY MEDICINE | Facility: CLINIC | Age: 64
End: 2024-09-23
Payer: COMMERCIAL

## 2024-09-23 VITALS
BODY MASS INDEX: 34.55 KG/M2 | SYSTOLIC BLOOD PRESSURE: 126 MMHG | HEART RATE: 81 BPM | HEIGHT: 60 IN | OXYGEN SATURATION: 96 % | DIASTOLIC BLOOD PRESSURE: 82 MMHG | WEIGHT: 176 LBS | TEMPERATURE: 97.2 F

## 2024-09-23 DIAGNOSIS — Z00.00 ENCOUNTER FOR GENERAL ADULT MEDICAL EXAMINATION W/OUT ABNORMAL FINDINGS: ICD-10-CM

## 2024-09-23 DIAGNOSIS — G43.109 MIGRAINE WITH AURA, NOT INTRACTABLE, W/OUT STATUS MIGRAINOSUS: ICD-10-CM

## 2024-09-23 DIAGNOSIS — F41.8 OTHER SPECIFIED ANXIETY DISORDERS: ICD-10-CM

## 2024-09-23 DIAGNOSIS — E78.00 PURE HYPERCHOLESTEROLEMIA, UNSPECIFIED: ICD-10-CM

## 2024-09-23 DIAGNOSIS — M19.90 UNSPECIFIED OSTEOARTHRITIS, UNSPECIFIED SITE: ICD-10-CM

## 2024-09-23 DIAGNOSIS — R73.01 IMPAIRED FASTING GLUCOSE: ICD-10-CM

## 2024-09-23 PROCEDURE — 99396 PREV VISIT EST AGE 40-64: CPT

## 2024-09-23 RX ORDER — CELECOXIB 100 MG/1
100 CAPSULE ORAL TWICE DAILY
Refills: 0 | Status: ACTIVE | COMMUNITY

## 2024-09-23 NOTE — ASSESSMENT
[FreeTextEntry1] : MOLLY PAK is a 64 year old female here for a physical exam.  She is also here to follow up on medical issues as noted above.  Molly has high cholesterol, h/o IFG (though most recent labs wnl), GERD (only has occas sxs and uses med prn), colon polyps, and a remote h/o bladder cancer (in her 30s, s/p surg resection, JONH since), urinary incontinence (mixed type) and situational (eg flight related) anxiety, elevated CRP (6 in 2023, likely related to her severe OA), OA, migraine HA, suboptimal vit B12 level (298 in 12/2023).   She has severe end stage R hip OA and this pain is significantly life interfering and has not resolved/responded to conservative mgmt. incl NSAIDs, PT, steroid joint injections etc. MRI R hip 5/2024 showed mod effusion, labral tear and severe OA. She plans R hip joint replacement in later 2024. Revd pre-op instructions briefly re how to avoid clots/constipation post operatively and she will RTO for formal pre-op visit prior to her surgery.

## 2024-09-23 NOTE — REVIEW OF SYSTEMS
[Hot Flashes] : hot flashes [Recent Change In Weight] : ~T recent weight change [Palpitations] : palpitations [Heartburn] : heartburn [Joint Pain] : joint pain [Joint Stiffness] : joint stiffness [Anxiety] : anxiety [Negative] : Heme/Lymph [Fever] : no fever [Chills] : no chills [Fatigue] : no fatigue [Chest Pain] : no chest pain [Lower Ext Edema] : no lower extremity edema [Orthopnea] : no orthopnea [Paroxysmal Nocturnal Dyspnea] : no paroxysmal nocturnal dyspnea [Abdominal Pain] : no abdominal pain [Diarrhea] : diarrhea [Vomiting] : no vomiting [Melena] : no melena [Muscle Pain] : no muscle pain [Back Pain] : no back pain [Insomnia] : no insomnia [Depression] : no depression [FreeTextEntry2] : hot flashes, some wgt gain over past year as not able to be as active as she would like. Going to work with her daughter on eating a Mediterranean diet to try to lose a little wgt prior to hip surg  [FreeTextEntry5] : occas palps, had eval with Dr. Sykes and all was benign/wnl  [FreeTextEntry7] : heartburn only rarely an issue at this point, uses Pepcid complete prn  [FreeTextEntry9] : R hip OA, seeing Dr. Ortez, see HPI; L hip previously was an issue but has been not bad for past year  [de-identified] : flight anxiety, uses diazepam prn with good effect and med is well tolerated

## 2024-09-23 NOTE — PHYSICAL EXAM
[No Acute Distress] : no acute distress [Well Developed] : well developed [Well-Appearing] : well-appearing [Normal Sclera/Conjunctiva] : normal sclera/conjunctiva [EOMI] : extraocular movements intact [Normal Outer Ear/Nose] : the outer ears and nose were normal in appearance [Normal Oropharynx] : the oropharynx was normal [No JVD] : no jugular venous distention [No Lymphadenopathy] : no lymphadenopathy [Supple] : supple [Thyroid Normal, No Nodules] : the thyroid was normal and there were no nodules present [No Respiratory Distress] : no respiratory distress  [No Accessory Muscle Use] : no accessory muscle use [Clear to Auscultation] : lungs were clear to auscultation bilaterally [Normal Rate] : normal rate  [Regular Rhythm] : with a regular rhythm [Normal S1, S2] : normal S1 and S2 [No Carotid Bruits] : no carotid bruits [No Varicosities] : no varicosities [Pedal Pulses Present] : the pedal pulses are present [No Edema] : there was no peripheral edema [No Extremity Clubbing/Cyanosis] : no extremity clubbing/cyanosis [Soft] : abdomen soft [Non Tender] : non-tender [Non-distended] : non-distended [No Masses] : no abdominal mass palpated [No HSM] : no HSM [Normal Bowel Sounds] : normal bowel sounds [Normal Posterior Cervical Nodes] : no posterior cervical lymphadenopathy [Normal Anterior Cervical Nodes] : no anterior cervical lymphadenopathy [No Joint Swelling] : no joint swelling [Grossly Normal Strength/Tone] : grossly normal strength/tone [No Rash] : no rash [Coordination Grossly Intact] : coordination grossly intact [No Focal Deficits] : no focal deficits [Normal Gait] : normal gait [Normal Affect] : the affect was normal [Normal Insight/Judgement] : insight and judgment were intact [de-identified] : overweight BMI 34 [de-identified] : 2/6 soft systolic murmur heard LUSB with radiation to B neck

## 2024-09-23 NOTE — HEALTH RISK ASSESSMENT
[No falls in past year] : Patient reported no falls in the past year [0] : 2) Feeling down, depressed, or hopeless: Not at all (0) [PHQ-2 Negative - No further assessment needed] : PHQ-2 Negative - No further assessment needed [Never] : Never [[id number]] : 0 None

## 2024-09-23 NOTE — PLAN
[FreeTextEntry1] : Continue all medications as prescribed. Check labs as above incl will repeat CRP and vit b12 to f/u on these levels. Will adjust any medications based upon lab results.  Reviewed age-appropriate preventive screening tests with patient. UTD on gyn exam, mammogram, DEXA and colonoscopy screening.   Reviewed/recommended annual flu vaccine, and Shingrix #2 to complete series.  She  sees a cardiologist regularly and does not need an EKG today.  BP goal is <=135/85 on average on home checks. Advised to let us know if BPs are above goal on home checks.   Lifestyle measures to optimize BP reviewed, including regular exercise, adequate sleep, Mediterranean or DASH style clean eating, mindfulness/meditation, magnesium 100-400 mg supplementation, avoid NSAIDS, stress management techniques etc.  LDL goal <=100 ideally. Reviewed risks/benefits of statin med. Recommended excellent hydration +/- co Q10 (100-400 mg daily) to decrease risk for statin related myalgias.   Discussed clean eating (eg Mediterranean style plant based eating plan) and regular exercise/staying as physically active as possible.  Include balance exercises and strength training and core strengthening exercises for bone health and to decrease risk for falls.  Recommended Tylenol XS or Arthritis 1-2 pills BID-TID if helpful, ok to use NSAIDs sparingly with food (but revd r/b/se of NSAIDs incl CV, renal and GI and she should limit use as much as possible), regular stretching, heat/ice prn, consider turmeric supplementation, consider CBD cream or oral options, gentle yoga/chair yoga, Pilates, strengthen core muscles, consider chiro and/or massage and/or acupuncture. If these measures are not helpful enough then consider consultation with ortho and/or pain  for further eval and treatment.  Recommended excellent hydration, stress reduction techniques, clean eating, get stable/adequate rest daily, consider magnesium supplementation and/or Migralief or Butterbur as prevention measures to try for migraine HA. Use NSAIDs (eg ibuprofen, Excedrin migraine) or Tylenol (if helpful) as needed along with rest in cool/dark/quiet environment ASAP prn migraine HA. Revd r/b/se Triptan meds (eg sumatriptan) and dosing ASAP at onset of sxs gives best effect.  Reviewed importance of good self care (e.g. meditation, yoga, adequate rest, regular exercise, magnesium, clean eating, etc.).  Reviewed r/b/se benzodiazepine meds (including incr risk for dizziness, falls and cognitive issues including dementia) and that these are intended to be used rarely/occasionally only and not regularly. Reviewed need to safeguard supply of med. If frequency of need for med increases significantly over time and is more than rare/occasional, pt should let us know and would consider trial of daily med (eg SSRI) at that time  Follow up with specialists as recommended by them.   Follow up for next physical in one year. RPA visit (chol, IFG etc) and repeat fasting labs in about 6 months, or if feeling well and labs are wnl and she will be seeing specialists she can defer on 6 month visit with me and just f/u in 1 yr if she is comfortable with that plan.

## 2024-09-23 NOTE — HISTORY OF PRESENT ILLNESS
[FreeTextEntry1] : ADRIANNA PAK is a 64 year old female here for a physical exam. [de-identified] : Her last physical exam was last year  Vaccines: Tetanus is up to date, Tdap 9/2023 Shingrix is NOT up to date, 10/2020 though needs second dose to complete series  Her last dentist visit was within past 6-12 months Her last eye doctor appointment was less than one year ago, Dr. Cutler Her last dermatologist visit was less than one year ago, 12/2023 Dr. Destinee Valdez  GYN visit is up to date though due again now, 6/2024 Dr. Lockhart ([place]) Mammogram is up to date, 8/2024 stable/benign, [facility] Colon cancer screening is up to date, colonoscopy 11/2022, polyp, rpt at 5yrs (11/2027), Dr. Hernandez DEXA is up to date, 6/2023 normal  Her diet is healthy overall Exercise: off track with exercise due to end stage R hip OA   Molly has high cholesterol, h/o IFG (though most recent labs wnl), GERD (only has occas sxs and uses med prn), colon polyps, and a remote h/o bladder cancer (in her 30s, s/p surg resection, JONH since), urinary incontinence (mixed type) and situational (eg flight related) anxiety, elevated CRP (6 in 2023, likely related to her severe OA), OA, migraine HA, suboptimal vit B12 level (298 in 12/2023).  She is up to date on follow up and recommended testing with specialists including Dr. Sykes (card), Dr. Ruby (urol), Dr. Ortez (ortho)  She had 6/2023 Coronary CT and her calcium score was 0. Tolerating Rosuvastatin 5 mg daily well.  She has severe end stage R hip OA and this pain is significantly life interfering and has not responded to conservative mgmt. incl NSAIDs, PT, steroid joint injections etc. She has been seen/treated by Dr. Ortez and his team over past couple of years. Had a IR Rad steroid injection lasted only about 3 wks. Then tried steroid injection again with Clayton Goldstein and that lasted only 3 days.   Trial Rx Ibuprofen did not help. OTC NSAID plus Tylenol not helpful. Dr. Goldstein Rx'ed Celebrex  a few wks ago and that has signif helped her pain and mobility though recognizes she can not stay on this forever and so she Plans R hip joint replacement surg in later 2023. Probably early Dec as needs to wait at least 3 mos past most recent steroid injection and then might postpone until after T Giving. Has Rx for Tramadol from Dr. Ortez and uses occas but finds the Celebrex is much more effective.   She saw Dr. Bradley 12/2023 re migraine HA, scalp dysesthesias, episodes of decreased concentration/zoning out. EEG 12/2023 was normal. Vit B12 was 298, rest of labs ordered by Dr. Bradley were wnl. She was advised to add vit B12 supplement. She recommended Molly add an OTC B12 supplement and she has done so. She has noticed much less scalp tingling this year so likely the B12 supplementationis is helping her.   She had f/u with Dr. Lamine barnes nodule incidentally noted on her cardiac CT calcium test. he was not concerned and plans to have her do a rpt Chest CT at the 1 [facility]

## 2024-09-23 NOTE — HISTORY OF PRESENT ILLNESS
[FreeTextEntry1] : ADRIANNA PAK is a 64 year old female here for a physical exam. [de-identified] : Her last physical exam was last year  Vaccines: Tetanus is up to date, Tdap 9/2023 Shingrix is NOT up to date, 10/2020 though needs second dose to complete series  Her last dentist visit was within past 6-12 months Her last eye doctor appointment was less than one year ago, Dr. Cutler Her last dermatologist visit was less than one year ago, 12/2023 Dr. Destinee Valdez  GYN visit is up to date though due again now, 6/2024 Dr. Lockhart ([place]) Mammogram is up to date, 8/2024 stable/benign, [facility] Colon cancer screening is up to date, colonoscopy 11/2022, polyp, rpt at 5yrs (11/2027), Dr. Hernandez DEXA is up to date, 6/2023 normal  Her diet is healthy overall Exercise: off track with exercise due to end stage R hip OA   Molly has high cholesterol, h/o IFG (though most recent labs wnl), GERD (only has occas sxs and uses med prn), colon polyps, and a remote h/o bladder cancer (in her 30s, s/p surg resection, JONH since), urinary incontinence (mixed type) and situational (eg flight related) anxiety, elevated CRP (6 in 2023, likely related to her severe OA), OA, migraine HA, suboptimal vit B12 level (298 in 12/2023).  She is up to date on follow up and recommended testing with specialists including Dr. Sykes (card), Dr. Ruby (urol), Dr. Ortez (ortho)  She had 6/2023 Coronary CT and her calcium score was 0. Tolerating Rosuvastatin 5 mg daily well.  She has severe end stage R hip OA and this pain is significantly life interfering and has not responded to conservative mgmt. incl NSAIDs, PT, steroid joint injections etc. She has been seen/treated by Dr. Ortez and his team over past couple of years. Had a IR Rad steroid injection lasted only about 3 wks. Then tried steroid injection again with Clayton Goldstein and that lasted only 3 days.   Trial Rx Ibuprofen did not help. OTC NSAID plus Tylenol not helpful. Dr. Goldstein Rx'ed Celebrex  a few wks ago and that has signif helped her pain and mobility though recognizes she can not stay on this forever and so she Plans R hip joint replacement surg in later 2023. Probably early Dec as needs to wait at least 3 mos past most recent steroid injection and then might postpone until after T Giving. Has Rx for Tramadol from Dr. Ortez and uses occas but finds the Celebrex is much more effective.   She saw Dr. Bradley 12/2023 re migraine HA, scalp dysesthesias, episodes of decreased concentration/zoning out. EEG 12/2023 was normal. Vit B12 was 298, rest of labs ordered by Dr. Bradley were wnl. She was advised to add vit B12 supplement. She recommended Molly add an OTC B12 supplement and she has done so. She has noticed much less scalp tingling this year so likely the B12 supplementationis is helping her.   She had f/u with Dr. Lamine barnes nodule incidentally noted on her cardiac CT calcium test. he was not concerned and plans to have her do a rpt Chest CT at the [facility] yr [facility]

## 2024-09-23 NOTE — HEALTH RISK ASSESSMENT
[No falls in past year] : Patient reported no falls in the past year [0] : 2) Feeling down, depressed, or hopeless: Not at all (0) [PHQ-2 Negative - No further assessment needed] : PHQ-2 Negative - No further assessment needed [Never] : Never [[id number]] : 0
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
## 2024-09-23 NOTE — PHYSICAL EXAM
[No Acute Distress] : no acute distress [Well Developed] : well developed [Well-Appearing] : well-appearing [Normal Sclera/Conjunctiva] : normal sclera/conjunctiva [EOMI] : extraocular movements intact [Normal Outer Ear/Nose] : the outer ears and nose were normal in appearance [Normal Oropharynx] : the oropharynx was normal [No JVD] : no jugular venous distention [No Lymphadenopathy] : no lymphadenopathy [Supple] : supple [Thyroid Normal, No Nodules] : the thyroid was normal and there were no nodules present [No Respiratory Distress] : no respiratory distress  [No Accessory Muscle Use] : no accessory muscle use [Clear to Auscultation] : lungs were clear to auscultation bilaterally [Normal Rate] : normal rate  [Regular Rhythm] : with a regular rhythm [Normal S1, S2] : normal S1 and S2 [No Carotid Bruits] : no carotid bruits [No Varicosities] : no varicosities [Pedal Pulses Present] : the pedal pulses are present [No Edema] : there was no peripheral edema [No Extremity Clubbing/Cyanosis] : no extremity clubbing/cyanosis [Soft] : abdomen soft [Non Tender] : non-tender [Non-distended] : non-distended [No Masses] : no abdominal mass palpated [No HSM] : no HSM [Normal Bowel Sounds] : normal bowel sounds [Normal Posterior Cervical Nodes] : no posterior cervical lymphadenopathy [Normal Anterior Cervical Nodes] : no anterior cervical lymphadenopathy [No Joint Swelling] : no joint swelling [Grossly Normal Strength/Tone] : grossly normal strength/tone [No Rash] : no rash [Coordination Grossly Intact] : coordination grossly intact [No Focal Deficits] : no focal deficits [Normal Gait] : normal gait [Normal Affect] : the affect was normal [Normal Insight/Judgement] : insight and judgment were intact [de-identified] : overweight BMI 34 [de-identified] : 2/6 soft systolic murmur heard LUSB with radiation to B neck

## 2024-09-25 LAB
ALBUMIN SERPL ELPH-MCNC: 4.2 G/DL
ALP BLD-CCNC: 54 U/L
ALT SERPL-CCNC: 14 U/L
ANION GAP SERPL CALC-SCNC: 10 MMOL/L
AST SERPL-CCNC: 17 U/L
BASOPHILS # BLD AUTO: 0.05 K/UL
BASOPHILS NFR BLD AUTO: 0.8 %
BILIRUB SERPL-MCNC: 0.4 MG/DL
BUN SERPL-MCNC: 16 MG/DL
CALCIUM SERPL-MCNC: 9.3 MG/DL
CHLORIDE SERPL-SCNC: 107 MMOL/L
CHOLEST SERPL-MCNC: 139 MG/DL
CO2 SERPL-SCNC: 28 MMOL/L
CREAT SERPL-MCNC: 0.73 MG/DL
EGFR: 92 ML/MIN/1.73M2
EOSINOPHIL # BLD AUTO: 0.14 K/UL
EOSINOPHIL NFR BLD AUTO: 2.3 %
ESTIMATED AVERAGE GLUCOSE: 111 MG/DL
GLUCOSE SERPL-MCNC: 102 MG/DL
HBA1C MFR BLD HPLC: 5.5 %
HCT VFR BLD CALC: 40.1 %
HDLC SERPL-MCNC: 50 MG/DL
HGB BLD-MCNC: 13 G/DL
IMM GRANULOCYTES NFR BLD AUTO: 0.5 %
LDLC SERPL CALC-MCNC: 72 MG/DL
LYMPHOCYTES # BLD AUTO: 2.09 K/UL
LYMPHOCYTES NFR BLD AUTO: 34 %
MAN DIFF?: NORMAL
MCHC RBC-ENTMCNC: 30.6 PG
MCHC RBC-ENTMCNC: 32.4 GM/DL
MCV RBC AUTO: 94.4 FL
MONOCYTES # BLD AUTO: 0.58 K/UL
MONOCYTES NFR BLD AUTO: 9.4 %
NEUTROPHILS # BLD AUTO: 3.25 K/UL
NEUTROPHILS NFR BLD AUTO: 53 %
NONHDLC SERPL-MCNC: 89 MG/DL
PLATELET # BLD AUTO: 207 K/UL
POTASSIUM SERPL-SCNC: 5.2 MMOL/L
PROT SERPL-MCNC: 6.4 G/DL
RBC # BLD: 4.25 M/UL
RBC # FLD: 13.1 %
SODIUM SERPL-SCNC: 145 MMOL/L
TRIGL SERPL-MCNC: 91 MG/DL
VIT B12 SERPL-MCNC: 769 PG/ML
WBC # FLD AUTO: 6.14 K/UL

## 2024-10-03 RX ORDER — TRAMADOL HYDROCHLORIDE 50 MG/1
50 TABLET, COATED ORAL
Qty: 42 | Refills: 0 | Status: ACTIVE | COMMUNITY
Start: 2024-10-03 | End: 1900-01-01

## 2024-10-28 ENCOUNTER — APPOINTMENT (OUTPATIENT)
Dept: ORTHOPEDIC SURGERY | Facility: CLINIC | Age: 64
End: 2024-10-28
Payer: COMMERCIAL

## 2024-10-28 VITALS — WEIGHT: 174 LBS | BODY MASS INDEX: 34.16 KG/M2 | HEIGHT: 60 IN

## 2024-10-28 DIAGNOSIS — M16.11 UNILATERAL PRIMARY OSTEOARTHRITIS, RIGHT HIP: ICD-10-CM

## 2024-10-28 PROCEDURE — 73503 X-RAY EXAM HIP UNI 4/> VIEWS: CPT | Mod: RT

## 2024-10-28 PROCEDURE — 99214 OFFICE O/P EST MOD 30 MIN: CPT

## 2024-11-05 ENCOUNTER — OFFICE (OUTPATIENT)
Dept: URBAN - METROPOLITAN AREA CLINIC 102 | Facility: CLINIC | Age: 64
Setting detail: OPHTHALMOLOGY
End: 2024-11-05
Payer: COMMERCIAL

## 2024-11-05 VITALS — HEIGHT: 55 IN

## 2024-11-05 DIAGNOSIS — H25.13: ICD-10-CM

## 2024-11-05 DIAGNOSIS — H01.001: ICD-10-CM

## 2024-11-05 DIAGNOSIS — H01.002: ICD-10-CM

## 2024-11-05 DIAGNOSIS — H02.825: ICD-10-CM

## 2024-11-05 DIAGNOSIS — H01.005: ICD-10-CM

## 2024-11-05 DIAGNOSIS — H40.033: ICD-10-CM

## 2024-11-05 DIAGNOSIS — H02.822: ICD-10-CM

## 2024-11-05 DIAGNOSIS — H16.223: ICD-10-CM

## 2024-11-05 DIAGNOSIS — H01.004: ICD-10-CM

## 2024-11-05 DIAGNOSIS — H43.393: ICD-10-CM

## 2024-11-05 PROCEDURE — 92014 COMPRE OPH EXAM EST PT 1/>: CPT | Performed by: OPHTHALMOLOGY

## 2024-11-05 ASSESSMENT — KERATOMETRY
OD_K2POWER_DIOPTERS: 45.75
OD_K1POWER_DIOPTERS: 45.00
OS_AXISANGLE_DEGREES: 78
OS_K1POWER_DIOPTERS: 45.25
OD_AXISANGLE_DEGREES: 107
METHOD_AUTO_MANUAL: AUTO
OS_K2POWER_DIOPTERS: 45.75

## 2024-11-05 ASSESSMENT — CONFRONTATIONAL VISUAL FIELD TEST (CVF)
OS_FINDINGS: FULL
OD_FINDINGS: FULL

## 2024-11-05 ASSESSMENT — REFRACTION_AUTOREFRACTION
OS_AXIS: 94
OS_CYLINDER: -0.50
OD_AXIS: 51
OD_SPHERE: -0.25
OS_SPHERE: +0.25
OD_CYLINDER: -0.25

## 2024-11-05 ASSESSMENT — REFRACTION_CURRENTRX
OS_OVR_VA: 20/
OD_AXIS: 0
OS_CYLINDER: SPHERE
OD_SPHERE: +2.00
OD_VPRISM_DIRECTION: SV
OS_SPHERE: +2.00
OS_VPRISM_DIRECTION: SV
OD_CYLINDER: SPHERE
OS_AXIS: 0
OD_OVR_VA: 20/

## 2024-11-05 ASSESSMENT — TONOMETRY
OS_IOP_MMHG: 16
OD_IOP_MMHG: 20

## 2024-11-05 ASSESSMENT — VISUAL ACUITY
OS_BCVA: 20/20-1
OD_BCVA: 20/20

## 2024-11-05 ASSESSMENT — LID EXAM ASSESSMENTS
OD_BLEPHARITIS: RLL RUL T
OS_BLEPHARITIS: LLL LUL T

## 2024-11-06 ENCOUNTER — OFFICE (OUTPATIENT)
Dept: URBAN - METROPOLITAN AREA CLINIC 102 | Facility: CLINIC | Age: 64
Setting detail: OPHTHALMOLOGY
End: 2024-11-06
Payer: COMMERCIAL

## 2024-11-06 DIAGNOSIS — D49.2: ICD-10-CM

## 2024-11-06 DIAGNOSIS — D48.7: ICD-10-CM

## 2024-11-06 PROBLEM — H43.393 VITREOUS FLOATERS; BOTH EYES: Status: ACTIVE | Noted: 2024-11-05

## 2024-11-06 PROCEDURE — 92285 EXTERNAL OCULAR PHOTOGRAPHY: CPT | Performed by: OPHTHALMOLOGY

## 2024-11-06 PROCEDURE — 92012 INTRM OPH EXAM EST PATIENT: CPT | Performed by: OPHTHALMOLOGY

## 2024-11-06 RX ORDER — TRAMADOL HYDROCHLORIDE 50 MG/1
50 TABLET, COATED ORAL
Qty: 42 | Refills: 0 | Status: ACTIVE | COMMUNITY
Start: 2024-11-06 | End: 1900-01-01

## 2024-11-06 ASSESSMENT — REFRACTION_CURRENTRX
OD_SPHERE: +2.00
OD_VPRISM_DIRECTION: SV
OS_CYLINDER: SPHERE
OD_CYLINDER: SPHERE
OD_AXIS: 0
OS_SPHERE: +2.00
OS_OVR_VA: 20/
OS_AXIS: 0
OS_VPRISM_DIRECTION: SV
OD_OVR_VA: 20/

## 2024-11-06 ASSESSMENT — REFRACTION_AUTOREFRACTION
OD_SPHERE: -0.25
OS_AXIS: 94
OS_SPHERE: +0.25
OS_CYLINDER: -0.50
OD_AXIS: 51
OD_CYLINDER: -0.25

## 2024-11-06 ASSESSMENT — KERATOMETRY
METHOD_AUTO_MANUAL: AUTO
OD_K1POWER_DIOPTERS: 45.00
OD_K2POWER_DIOPTERS: 45.75
OS_AXISANGLE_DEGREES: 78
OS_K2POWER_DIOPTERS: 45.75
OD_AXISANGLE_DEGREES: 107
OS_K1POWER_DIOPTERS: 45.25

## 2024-11-06 ASSESSMENT — LID EXAM ASSESSMENTS
OD_BLEPHARITIS: RLL RUL T
OS_BLEPHARITIS: LLL LUL T

## 2024-11-06 ASSESSMENT — CONFRONTATIONAL VISUAL FIELD TEST (CVF)
OD_FINDINGS: FULL
OS_FINDINGS: FULL

## 2024-11-06 ASSESSMENT — VISUAL ACUITY
OS_BCVA: 20/20
OD_BCVA: 20/20

## 2024-11-12 ENCOUNTER — OUTPATIENT (OUTPATIENT)
Dept: OUTPATIENT SERVICES | Facility: HOSPITAL | Age: 64
LOS: 1 days | End: 2024-11-12
Payer: COMMERCIAL

## 2024-11-12 VITALS
HEIGHT: 60 IN | HEART RATE: 70 BPM | OXYGEN SATURATION: 100 % | RESPIRATION RATE: 15 BRPM | WEIGHT: 178.57 LBS | DIASTOLIC BLOOD PRESSURE: 85 MMHG | TEMPERATURE: 98 F | SYSTOLIC BLOOD PRESSURE: 149 MMHG

## 2024-11-12 DIAGNOSIS — Z98.890 OTHER SPECIFIED POSTPROCEDURAL STATES: Chronic | ICD-10-CM

## 2024-11-12 DIAGNOSIS — Z01.818 ENCOUNTER FOR OTHER PREPROCEDURAL EXAMINATION: ICD-10-CM

## 2024-11-12 DIAGNOSIS — Z90.89 ACQUIRED ABSENCE OF OTHER ORGANS: Chronic | ICD-10-CM

## 2024-11-12 PROBLEM — G47.33 OBSTRUCTIVE SLEEP APNEA (ADULT) (PEDIATRIC): Chronic | Status: INACTIVE | Noted: 2018-11-16 | Resolved: 2024-11-12

## 2024-11-12 LAB
A1C WITH ESTIMATED AVERAGE GLUCOSE RESULT: 5.3 % — SIGNIFICANT CHANGE UP (ref 4–5.6)
ALBUMIN SERPL ELPH-MCNC: 3.6 G/DL — SIGNIFICANT CHANGE UP (ref 3.3–5)
ANION GAP SERPL CALC-SCNC: 4 MMOL/L — LOW (ref 5–17)
BASOPHILS # BLD AUTO: 0.04 K/UL — SIGNIFICANT CHANGE UP (ref 0–0.2)
BASOPHILS NFR BLD AUTO: 0.7 % — SIGNIFICANT CHANGE UP (ref 0–2)
BUN SERPL-MCNC: 15 MG/DL — SIGNIFICANT CHANGE UP (ref 7–23)
CALCIUM SERPL-MCNC: 9.4 MG/DL — SIGNIFICANT CHANGE UP (ref 8.5–10.1)
CHLORIDE SERPL-SCNC: 107 MMOL/L — SIGNIFICANT CHANGE UP (ref 96–108)
CO2 SERPL-SCNC: 29 MMOL/L — SIGNIFICANT CHANGE UP (ref 22–31)
CREAT SERPL-MCNC: 0.74 MG/DL — SIGNIFICANT CHANGE UP (ref 0.5–1.3)
EGFR: 90 ML/MIN/1.73M2 — SIGNIFICANT CHANGE UP
EOSINOPHIL # BLD AUTO: 0.16 K/UL — SIGNIFICANT CHANGE UP (ref 0–0.5)
EOSINOPHIL NFR BLD AUTO: 2.7 % — SIGNIFICANT CHANGE UP (ref 0–6)
ESTIMATED AVERAGE GLUCOSE: 105 MG/DL — SIGNIFICANT CHANGE UP (ref 68–114)
GLUCOSE SERPL-MCNC: 120 MG/DL — HIGH (ref 70–99)
HCT VFR BLD CALC: 39.5 % — SIGNIFICANT CHANGE UP (ref 34.5–45)
HGB BLD-MCNC: 12.9 G/DL — SIGNIFICANT CHANGE UP (ref 11.5–15.5)
IMM GRANULOCYTES NFR BLD AUTO: 0.5 % — SIGNIFICANT CHANGE UP (ref 0–0.9)
LYMPHOCYTES # BLD AUTO: 1.84 K/UL — SIGNIFICANT CHANGE UP (ref 1–3.3)
LYMPHOCYTES # BLD AUTO: 30.7 % — SIGNIFICANT CHANGE UP (ref 13–44)
MCHC RBC-ENTMCNC: 30.4 PG — SIGNIFICANT CHANGE UP (ref 27–34)
MCHC RBC-ENTMCNC: 32.7 G/DL — SIGNIFICANT CHANGE UP (ref 32–36)
MCV RBC AUTO: 92.9 FL — SIGNIFICANT CHANGE UP (ref 80–100)
MONOCYTES # BLD AUTO: 0.54 K/UL — SIGNIFICANT CHANGE UP (ref 0–0.9)
MONOCYTES NFR BLD AUTO: 9 % — SIGNIFICANT CHANGE UP (ref 2–14)
MRSA PCR RESULT.: SIGNIFICANT CHANGE UP
NEUTROPHILS # BLD AUTO: 3.39 K/UL — SIGNIFICANT CHANGE UP (ref 1.8–7.4)
NEUTROPHILS NFR BLD AUTO: 56.4 % — SIGNIFICANT CHANGE UP (ref 43–77)
PLATELET # BLD AUTO: 211 K/UL — SIGNIFICANT CHANGE UP (ref 150–400)
POTASSIUM SERPL-MCNC: 4.2 MMOL/L — SIGNIFICANT CHANGE UP (ref 3.5–5.3)
POTASSIUM SERPL-SCNC: 4.2 MMOL/L — SIGNIFICANT CHANGE UP (ref 3.5–5.3)
RBC # BLD: 4.25 M/UL — SIGNIFICANT CHANGE UP (ref 3.8–5.2)
RBC # FLD: 12.5 % — SIGNIFICANT CHANGE UP (ref 10.3–14.5)
S AUREUS DNA NOSE QL NAA+PROBE: SIGNIFICANT CHANGE UP
SODIUM SERPL-SCNC: 140 MMOL/L — SIGNIFICANT CHANGE UP (ref 135–145)
WBC # BLD: 6 K/UL — SIGNIFICANT CHANGE UP (ref 3.8–10.5)
WBC # FLD AUTO: 6 K/UL — SIGNIFICANT CHANGE UP (ref 3.8–10.5)

## 2024-11-12 PROCEDURE — 93010 ELECTROCARDIOGRAM REPORT: CPT

## 2024-11-12 PROCEDURE — 80048 BASIC METABOLIC PNL TOTAL CA: CPT

## 2024-11-12 PROCEDURE — 87640 STAPH A DNA AMP PROBE: CPT

## 2024-11-12 PROCEDURE — 36415 COLL VENOUS BLD VENIPUNCTURE: CPT

## 2024-11-12 PROCEDURE — 82040 ASSAY OF SERUM ALBUMIN: CPT

## 2024-11-12 PROCEDURE — 85025 COMPLETE CBC W/AUTO DIFF WBC: CPT

## 2024-11-12 PROCEDURE — 83036 HEMOGLOBIN GLYCOSYLATED A1C: CPT

## 2024-11-12 PROCEDURE — 93005 ELECTROCARDIOGRAM TRACING: CPT

## 2024-11-12 PROCEDURE — 99214 OFFICE O/P EST MOD 30 MIN: CPT | Mod: 25

## 2024-11-12 PROCEDURE — 87641 MR-STAPH DNA AMP PROBE: CPT

## 2024-11-12 NOTE — H&P PST ADULT - NSICDXPASTMEDICALHX_GEN_ALL_CORE_FT
PAST MEDICAL HISTORY:  Breast cyst, left     Carpal tunnel syndrome     Indigestion     Lipoma     Mild obstructive sleep apnea     PAC (premature atrial contraction)     Primary osteoarthritis of right hip     Right hip pain     Torn meniscus left knee

## 2024-11-12 NOTE — H&P PST ADULT - NSICDXPASTSURGICALHX_GEN_ALL_CORE_FT
PAST SURGICAL HISTORY:  H/O arthroscopy of left knee     H/O colonoscopy     History of carpal tunnel release left  2008  right 2004    History of lumpectomy of left breast x2 benign    History of tonsillectomy

## 2024-11-12 NOTE — H&P PST ADULT - NSANTHOSAYNRD_GEN_A_CORE
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 NAEL screening performed.  STOP BANG Legend: 0-2 = LOW Risk; 3-4 = INTERMEDIATE Risk; 5-8 = HIGH Risk

## 2024-11-12 NOTE — H&P PST ADULT - ASSESSMENT
64 year old female  presents to PST for planned Right total hip replacement with Dr. Ortez      Plan:  1. PST instructions given; NPO status/instructions to be given by [facility].  Joint education booklet provided.  Patient will attend class.    2. Pt instructed to take following meds on day of surgery: none  3. Pt instructed to take routine evening medications unless indicated   4. Stop NSAIDS ( Aspirin Aleve Motrin Mobic Diclofenac), herbal supplements, MVI, Vitamin fish oil 7 days prior to surgery unless directed by surgeon or cardiologist;   5. Medical Optimization with Dr Sexton, Cardiac optimization with Onel Smith  6. EZ wash instructions given & mupirocin instructions given  7. Labs EKG done as per surgeon request    8, PATIENT WILL REQUIRE A ROLLING WALKER AT HOME DUE TO THEIR DIAGNOSIS OF OSTEOARTHRITIS OF HIP TO HELP COMPLETE THEIR MRADLS     CAPRINI SCORE Version 2013    AGE RELATED RISK FACTORS                                                             [ ] Age 41-60 years             [1 point]  [x ] Age: 61-74 years            [2 points]                 [ ] Age 75 years or over     [3 points]             DISEASE RELATED RISK FACTORS                                                       [ ] Current swollen legs (pitting edema of any level)     [1 point]                     [ ] Varicose veins (visible, bulging vein, not spider veins or surgically removed veins)   [1 point]                                 [x ] BMI > 25 Kg/m2                       [1 point]  [ ] BMI > 40 kg/m2                       [1 point]                                 [ ] Serious infection (within past month, requiring hospitalization and IV antibiotics)    [1 point]                     [ ] Lung disease ( interstitial: COPD, emphysema, sarcoid, 9-11 illness, NOT asthma)       [1 point]                                                                          [ ] Acute myocardial infarction (within past month)      [1 point]                  [ ] Congestive heart failure (episode within past month or taking CHF meds)                   [1 point]         [ ] Inflammatory bowel disease (Crohns disease or ulcerative colitis, not irritable bowel syndrome)   [1 point]                  [ ] Central venous access, PICC line or Port (within past month)     [2 points]                                                             [ ] Stroke (within past month)     [5 points]    [ ] Previous or present malignancy (includes melanoma but not basal cell carcinoma, each incidence of cancer scores 2 points-not metastases)  [2 points]                                                                                                                                                         HEMATOLOGY RELATED FACTORS                                                         [ ] History of DVT or PE (including superficial venous thrombosis)    [3 points]                    [ ] Positive family history for DVT or PE (includes first-, second-, and third-degree relatives)   [3 points]   [ ] Personal or family history of genetic thrombophilia (family history counts only if it has not been confirmed that patient does not have this genetic marker)                       [ ] Prothrombin 13500N mutation                   [3 points]                           [ ] Factor V Leiden                                               [3 points]            [ ] Antithrombin III deficiency                           [3 points]         [ ] Protein C & S deficiency                                [3 points]              [ ] Dysfibrinogenemia                                         [3 points]  [ ] Personal history of acquired thrombophilia                       [ ] Lupus anticoagulant                                       [3 points]                                                                  [ ] Anticardiolipin antibodies                             [3 points]              [ ] Antiphospholipid antibodies                         [3 points]                                                         [ ] High homocysteine in the blood                  [3 points]                                                    [ ] Myeloproliferative disorders (including thrombocytosis)    [3 points]            [ ] HIV                                                                     [3 points]                                                    [ ] Heparin induced thrombocytopenia            [3 points]                                        MOBILITY RELATED FACTORS  [ ] Bed rest or restricted mobility (inability to ambulate 30 feet continuously or removable leg brace) for less than 72 hours    [1 point]  [ ] Nonremovable plaster cast or mold that prevents calf muscle use   [2 points]  [ ] Bed bound  or restricted mobility for more than 72 hours                  [2 points]    GENDER SPECIFIC FACTORS  [ ] Pregnancy or had a baby within the last month   [1 point]  [ ] Hormone therapy  or oral contraception               [1 point]  [ ] Current use of estrogen-like drugs (raloxifine, tamoxifen, anastrozole, letrozole)                                [1 point]  [ ] History of unexplained stillborn infant, premature birth with toxemia or growth-restricted infant   [1 point]  [ ] Recurrent spontaneous abortions (3 or more)      [1 point]    OTHER RISK FACTORS                                         [ ] Current smoker (includes vaping and smoking marijuana)      [1 point]  [ ] Diabetes requiring insulin     [1 point]                   [ ] Chemotherapy (includes methotrexate for rheumatoid arthritis, hydroxyurea for thrombocytosis)    [1 point]  [ ] Blood transfusion(s)               [1 point]    SURGERY RELATED RISK FACTORS  [ ]  section within the last month                    [1 point]  [ ] Minor surgery is planned (less than 45 minutes)     [1 point]  [ ] Past major surgery (longer than 45 minutes) within past month  [2 points]  [ ] Planned major surgery lasting more than 45 minutes (includes laparoscopic and arthroscopic; do not add to the "5" for hip and knee replacement)    [2 points]  [ ] Length of surgery over 2 hours (includes anesthesia time; do not add to the "5" for hip and knee replacement)   [1 point]  [x ] Elective hip or knee joint replacement surgery         [5 points]                                               TRAUMA RELATED RISK FACTORS  [ ] Fracture of the hip, pelvis, or leg                       [5 points]  [ ] Spinal cord injury resulting in paralysis (within the past month)    [5 points]  [ ] Paralysis  (within the past month)                      [5 points]  [ ] Multiple trauma (within the past month)         [5 Points]    Total Score [   8     ]    Total hip and total knee replacement:  Caprini score 9 or less: LOW risk  Caprini score 10 or highter: HIGH RISK    Caprini score 0-2: Low Risk, NO VTE prophylaxis required for most patients, encourage ambulation  Caprini score 3-6: Moderate Risk , pharmacologic VTE prophylaxis is indicated for most patients (in the absence of contraindications)  Caprini score 7 or higher: High risk, pharmocologic VTE prophylaxis indicated for most patients (in the absence of contraindications)

## 2024-11-12 NOTE — H&P PST ADULT - NSICDXFAMILYHX_GEN_ALL_CORE_FT
FAMILY HISTORY:  Father  Still living? No  Family history of bladder cancer, Age at diagnosis: Age Unknown  FH: type 2 diabetes, Age at diagnosis: Age Unknown    Mother  Still living? No  Family history of CHF (congestive heart failure), Age at diagnosis: Age Unknown    Grandparent  Still living? No  Family hx of lung cancer, Age at diagnosis: Age Unknown

## 2024-11-12 NOTE — H&P PST ADULT - HISTORY OF PRESENT ILLNESS
64 year old female with PMH HLD, osteoarthritis with worsening right hip pain since May 2024; pain has continued to worsen despite conservative management including medication, physical therapy and steroid injections.  Patient presents to PST for planned Right total hip replacement with Dr. Ortez

## 2024-11-13 DIAGNOSIS — Z01.818 ENCOUNTER FOR OTHER PREPROCEDURAL EXAMINATION: ICD-10-CM

## 2024-11-13 RX ORDER — APREPITANT 40 MG/1
40 CAPSULE ORAL ONCE
Refills: 0 | Status: COMPLETED | OUTPATIENT
Start: 2024-12-06 | End: 2024-12-06

## 2024-11-24 PROBLEM — Z01.818 PRE-OP EXAMINATION: Status: ACTIVE | Noted: 2024-11-24

## 2024-11-25 ENCOUNTER — APPOINTMENT (OUTPATIENT)
Dept: FAMILY MEDICINE | Facility: CLINIC | Age: 64
End: 2024-11-25
Payer: COMMERCIAL

## 2024-11-25 VITALS
TEMPERATURE: 97.4 F | WEIGHT: 177 LBS | HEIGHT: 60 IN | HEART RATE: 73 BPM | BODY MASS INDEX: 34.75 KG/M2 | OXYGEN SATURATION: 99 %

## 2024-11-25 VITALS — SYSTOLIC BLOOD PRESSURE: 138 MMHG | DIASTOLIC BLOOD PRESSURE: 70 MMHG

## 2024-11-25 VITALS — SYSTOLIC BLOOD PRESSURE: 142 MMHG | DIASTOLIC BLOOD PRESSURE: 80 MMHG

## 2024-11-25 DIAGNOSIS — E78.00 PURE HYPERCHOLESTEROLEMIA, UNSPECIFIED: ICD-10-CM

## 2024-11-25 DIAGNOSIS — Z01.818 ENCOUNTER FOR OTHER PREPROCEDURAL EXAMINATION: ICD-10-CM

## 2024-11-25 DIAGNOSIS — M16.11 UNILATERAL PRIMARY OSTEOARTHRITIS, RIGHT HIP: ICD-10-CM

## 2024-11-25 DIAGNOSIS — I10 ESSENTIAL (PRIMARY) HYPERTENSION: ICD-10-CM

## 2024-11-25 DIAGNOSIS — R73.01 IMPAIRED FASTING GLUCOSE: ICD-10-CM

## 2024-11-25 DIAGNOSIS — R79.82 ELEVATED C-REACTIVE PROTEIN (CRP): ICD-10-CM

## 2024-11-25 PROBLEM — M25.551 PAIN IN RIGHT HIP: Chronic | Status: ACTIVE | Noted: 2024-11-12

## 2024-11-25 PROBLEM — G47.33 OBSTRUCTIVE SLEEP APNEA (ADULT) (PEDIATRIC): Chronic | Status: ACTIVE | Noted: 2024-11-12

## 2024-11-25 PROCEDURE — 99214 OFFICE O/P EST MOD 30 MIN: CPT

## 2024-11-25 RX ORDER — METOPROLOL SUCCINATE 25 MG/1
25 TABLET, EXTENDED RELEASE ORAL
Qty: 90 | Refills: 3 | Status: ACTIVE | COMMUNITY

## 2024-12-04 RX ORDER — FOLIC ACID 1 MG/1
1 TABLET ORAL DAILY
Refills: 0 | Status: DISCONTINUED | OUTPATIENT
Start: 2024-12-06 | End: 2024-12-07

## 2024-12-04 RX ORDER — ACETAMINOPHEN 500 MG/5ML
1000 LIQUID (ML) ORAL EVERY 8 HOURS
Refills: 0 | Status: DISCONTINUED | OUTPATIENT
Start: 2024-12-07 | End: 2024-12-07

## 2024-12-04 RX ORDER — ASPIRIN 325 MG
81 TABLET ORAL
Refills: 0 | Status: DISCONTINUED | OUTPATIENT
Start: 2024-12-07 | End: 2024-12-07

## 2024-12-04 RX ORDER — SODIUM CHLORIDE 9 G/1000ML
500 INJECTION, SOLUTION INTRAVENOUS ONCE
Refills: 0 | Status: COMPLETED | OUTPATIENT
Start: 2024-12-06 | End: 2024-12-06

## 2024-12-04 RX ORDER — TRANEXAMIC ACID 1000 MG/10
1000 AMPUL (ML) INTRAVENOUS ONCE
Refills: 0 | Status: DISCONTINUED | OUTPATIENT
Start: 2024-12-06 | End: 2024-12-07

## 2024-12-04 RX ORDER — B1/B2/B3/B5/B6/B12/VIT C/FOLIC 500-0.5 MG
1 TABLET ORAL DAILY
Refills: 0 | Status: DISCONTINUED | OUTPATIENT
Start: 2024-12-06 | End: 2024-12-07

## 2024-12-04 RX ORDER — FERROUS SULFATE 137(45) MG
325 TABLET, EXTENDED RELEASE ORAL DAILY
Refills: 0 | Status: DISCONTINUED | OUTPATIENT
Start: 2024-12-06 | End: 2024-12-07

## 2024-12-04 RX ORDER — OXYCODONE HYDROCHLORIDE 30 MG/1
10 TABLET ORAL
Refills: 0 | Status: DISCONTINUED | OUTPATIENT
Start: 2024-12-06 | End: 2024-12-07

## 2024-12-04 RX ORDER — ONDANSETRON HCL/PF 4 MG/2 ML
4 VIAL (ML) INJECTION EVERY 6 HOURS
Refills: 0 | Status: DISCONTINUED | OUTPATIENT
Start: 2024-12-06 | End: 2024-12-07

## 2024-12-04 RX ORDER — CEFAZOLIN SODIUM IN 0.9 % NACL 3 G/100 ML
2000 INTRAVENOUS SOLUTION, PIGGYBACK (ML) INTRAVENOUS EVERY 8 HOURS
Refills: 0 | Status: DISCONTINUED | OUTPATIENT
Start: 2024-12-06 | End: 2024-12-06

## 2024-12-04 RX ORDER — OXYCODONE HYDROCHLORIDE 30 MG/1
5 TABLET ORAL
Refills: 0 | Status: DISCONTINUED | OUTPATIENT
Start: 2024-12-06 | End: 2024-12-07

## 2024-12-04 RX ORDER — SENNA 187 MG
2 TABLET ORAL AT BEDTIME
Refills: 0 | Status: DISCONTINUED | OUTPATIENT
Start: 2024-12-06 | End: 2024-12-07

## 2024-12-04 RX ORDER — CELECOXIB 50 MG/1
200 CAPSULE ORAL EVERY 12 HOURS
Refills: 0 | Status: DISCONTINUED | OUTPATIENT
Start: 2024-12-07 | End: 2024-12-07

## 2024-12-04 RX ORDER — MAGNESIUM HYDROXIDE 400 MG/5ML
30 SUSPENSION ORAL DAILY
Refills: 0 | Status: DISCONTINUED | OUTPATIENT
Start: 2024-12-06 | End: 2024-12-07

## 2024-12-04 RX ORDER — POLYETHYLENE GLYCOL 3350 17 G/17G
17 POWDER, FOR SOLUTION ORAL AT BEDTIME
Refills: 0 | Status: DISCONTINUED | OUTPATIENT
Start: 2024-12-06 | End: 2024-12-07

## 2024-12-04 RX ORDER — SODIUM CHLORIDE 9 G/1000ML
1000 INJECTION, SOLUTION INTRAVENOUS
Refills: 0 | Status: DISCONTINUED | OUTPATIENT
Start: 2024-12-07 | End: 2024-12-07

## 2024-12-04 RX ORDER — HYDROMORPHONE/SOD CHLOR,ISO/PF 2 MG/10 ML
0.5 SYRINGE (ML) INJECTION
Refills: 0 | Status: COMPLETED | OUTPATIENT
Start: 2024-12-06 | End: 2024-12-13

## 2024-12-06 ENCOUNTER — RESULT REVIEW (OUTPATIENT)
Age: 64
End: 2024-12-06

## 2024-12-06 ENCOUNTER — INPATIENT (INPATIENT)
Facility: HOSPITAL | Age: 64
LOS: 0 days | Discharge: HOME HEALTH | DRG: 470 | End: 2024-12-07
Attending: ORTHOPAEDIC SURGERY | Admitting: ORTHOPAEDIC SURGERY
Payer: COMMERCIAL

## 2024-12-06 ENCOUNTER — APPOINTMENT (OUTPATIENT)
Dept: ORTHOPEDIC SURGERY | Facility: HOSPITAL | Age: 64
End: 2024-12-06
Payer: COMMERCIAL

## 2024-12-06 ENCOUNTER — TRANSCRIPTION ENCOUNTER (OUTPATIENT)
Age: 64
End: 2024-12-06

## 2024-12-06 VITALS
TEMPERATURE: 98 F | HEART RATE: 77 BPM | SYSTOLIC BLOOD PRESSURE: 151 MMHG | HEIGHT: 60 IN | RESPIRATION RATE: 16 BRPM | OXYGEN SATURATION: 100 % | WEIGHT: 178.57 LBS | DIASTOLIC BLOOD PRESSURE: 84 MMHG

## 2024-12-06 DIAGNOSIS — Z98.890 OTHER SPECIFIED POSTPROCEDURAL STATES: Chronic | ICD-10-CM

## 2024-12-06 DIAGNOSIS — M16.11 UNILATERAL PRIMARY OSTEOARTHRITIS, RIGHT HIP: ICD-10-CM

## 2024-12-06 DIAGNOSIS — Z90.89 ACQUIRED ABSENCE OF OTHER ORGANS: Chronic | ICD-10-CM

## 2024-12-06 LAB
ANION GAP SERPL CALC-SCNC: 4 MMOL/L — LOW (ref 5–17)
BUN SERPL-MCNC: 13 MG/DL — SIGNIFICANT CHANGE UP (ref 7–23)
CALCIUM SERPL-MCNC: 8.6 MG/DL — SIGNIFICANT CHANGE UP (ref 8.5–10.1)
CHLORIDE SERPL-SCNC: 111 MMOL/L — HIGH (ref 96–108)
CO2 SERPL-SCNC: 27 MMOL/L — SIGNIFICANT CHANGE UP (ref 22–31)
CREAT SERPL-MCNC: 0.7 MG/DL — SIGNIFICANT CHANGE UP (ref 0.5–1.3)
EGFR: 97 ML/MIN/1.73M2 — SIGNIFICANT CHANGE UP
EGFR: 97 ML/MIN/1.73M2 — SIGNIFICANT CHANGE UP
GLUCOSE BLDC GLUCOMTR-MCNC: 120 MG/DL — HIGH (ref 70–99)
GLUCOSE BLDC GLUCOMTR-MCNC: 133 MG/DL — HIGH (ref 70–99)
GLUCOSE SERPL-MCNC: 133 MG/DL — HIGH (ref 70–99)
HCT VFR BLD CALC: 35.5 % — SIGNIFICANT CHANGE UP (ref 34.5–45)
HGB BLD-MCNC: 11.8 G/DL — SIGNIFICANT CHANGE UP (ref 11.5–15.5)
MCHC RBC-ENTMCNC: 30.9 PG — SIGNIFICANT CHANGE UP (ref 27–34)
MCHC RBC-ENTMCNC: 33.2 G/DL — SIGNIFICANT CHANGE UP (ref 32–36)
MCV RBC AUTO: 92.9 FL — SIGNIFICANT CHANGE UP (ref 80–100)
PLATELET # BLD AUTO: 154 K/UL — SIGNIFICANT CHANGE UP (ref 150–400)
POTASSIUM SERPL-MCNC: 3.8 MMOL/L — SIGNIFICANT CHANGE UP (ref 3.5–5.3)
POTASSIUM SERPL-SCNC: 3.8 MMOL/L — SIGNIFICANT CHANGE UP (ref 3.5–5.3)
RBC # BLD: 3.82 M/UL — SIGNIFICANT CHANGE UP (ref 3.8–5.2)
RBC # FLD: 12.5 % — SIGNIFICANT CHANGE UP (ref 10.3–14.5)
SODIUM SERPL-SCNC: 142 MMOL/L — SIGNIFICANT CHANGE UP (ref 135–145)
WBC # BLD: 7.42 K/UL — SIGNIFICANT CHANGE UP (ref 3.8–10.5)
WBC # FLD AUTO: 7.42 K/UL — SIGNIFICANT CHANGE UP (ref 3.8–10.5)

## 2024-12-06 PROCEDURE — 36415 COLL VENOUS BLD VENIPUNCTURE: CPT

## 2024-12-06 PROCEDURE — 73501 X-RAY EXAM HIP UNI 1 VIEW: CPT | Mod: 26,RT

## 2024-12-06 PROCEDURE — 82962 GLUCOSE BLOOD TEST: CPT

## 2024-12-06 PROCEDURE — C1713: CPT

## 2024-12-06 PROCEDURE — 27130 TOTAL HIP ARTHROPLASTY: CPT | Mod: RT

## 2024-12-06 PROCEDURE — 99254 IP/OBS CNSLTJ NEW/EST MOD 60: CPT

## 2024-12-06 PROCEDURE — 85027 COMPLETE CBC AUTOMATED: CPT

## 2024-12-06 PROCEDURE — 88300 SURGICAL PATH GROSS: CPT

## 2024-12-06 PROCEDURE — 88300 SURGICAL PATH GROSS: CPT | Mod: 26

## 2024-12-06 PROCEDURE — 97116 GAIT TRAINING THERAPY: CPT | Mod: GP

## 2024-12-06 PROCEDURE — 73501 X-RAY EXAM HIP UNI 1 VIEW: CPT | Mod: RT

## 2024-12-06 PROCEDURE — 97530 THERAPEUTIC ACTIVITIES: CPT | Mod: GP

## 2024-12-06 PROCEDURE — 80048 BASIC METABOLIC PNL TOTAL CA: CPT

## 2024-12-06 PROCEDURE — C1776: CPT

## 2024-12-06 RX ORDER — FENTANYL CITRATE-0.9 % NACL/PF 100MCG/2ML
50 SYRINGE (ML) INTRAVENOUS
Refills: 0 | Status: DISCONTINUED | OUTPATIENT
Start: 2024-12-06 | End: 2024-12-06

## 2024-12-06 RX ORDER — ONDANSETRON HCL/PF 4 MG/2 ML
4 VIAL (ML) INJECTION ONCE
Refills: 0 | Status: DISCONTINUED | OUTPATIENT
Start: 2024-12-06 | End: 2024-12-06

## 2024-12-06 RX ORDER — HYDROMORPHONE/SOD CHLOR,ISO/PF 2 MG/10 ML
0.5 SYRINGE (ML) INJECTION
Refills: 0 | Status: DISCONTINUED | OUTPATIENT
Start: 2024-12-06 | End: 2024-12-07

## 2024-12-06 RX ORDER — ACETAMINOPHEN 500 MG/5ML
1000 LIQUID (ML) ORAL EVERY 8 HOURS
Refills: 0 | Status: COMPLETED | OUTPATIENT
Start: 2024-12-06 | End: 2024-12-07

## 2024-12-06 RX ORDER — OXYCODONE HYDROCHLORIDE 30 MG/1
5 TABLET ORAL ONCE
Refills: 0 | Status: DISCONTINUED | OUTPATIENT
Start: 2024-12-06 | End: 2024-12-06

## 2024-12-06 RX ORDER — ASPIRIN 325 MG
1 TABLET ORAL
Qty: 56 | Refills: 0
Start: 2024-12-06 | End: 2025-01-02

## 2024-12-06 RX ORDER — SODIUM CHLORIDE 9 G/1000ML
500 INJECTION, SOLUTION INTRAVENOUS ONCE
Refills: 0 | Status: COMPLETED | OUTPATIENT
Start: 2024-12-06 | End: 2024-12-06

## 2024-12-06 RX ORDER — ROSUVASTATIN CALCIUM 20 MG/1
5 TABLET, FILM COATED ORAL AT BEDTIME
Refills: 0 | Status: DISCONTINUED | OUTPATIENT
Start: 2024-12-06 | End: 2024-12-07

## 2024-12-06 RX ORDER — POLYETHYLENE GLYCOL 3350 17 G/17G
17 POWDER, FOR SOLUTION ORAL
Qty: 1 | Refills: 0
Start: 2024-12-06

## 2024-12-06 RX ORDER — SENNA 187 MG
1 TABLET ORAL
Qty: 7 | Refills: 0
Start: 2024-12-06 | End: 2024-12-12

## 2024-12-06 RX ORDER — SODIUM CHLORIDE 9 G/1000ML
1000 INJECTION, SOLUTION INTRAVENOUS
Refills: 0 | Status: DISCONTINUED | OUTPATIENT
Start: 2024-12-06 | End: 2024-12-06

## 2024-12-06 RX ORDER — DEXAMETHASONE 0.5 MG/1
8 TABLET ORAL ONCE
Refills: 0 | Status: COMPLETED | OUTPATIENT
Start: 2024-12-07 | End: 2024-12-07

## 2024-12-06 RX ORDER — CELECOXIB 50 MG/1
1 CAPSULE ORAL
Qty: 60 | Refills: 0
Start: 2024-12-06 | End: 2025-01-04

## 2024-12-06 RX ORDER — ROSUVASTATIN CALCIUM 10 MG
1 TABLET ORAL
Refills: 0 | DISCHARGE

## 2024-12-06 RX ORDER — ACETAMINOPHEN 500 MG/5ML
2 LIQUID (ML) ORAL
Qty: 84 | Refills: 0
Start: 2024-12-06 | End: 2024-12-19

## 2024-12-06 RX ORDER — OXYCODONE HYDROCHLORIDE 30 MG/1
1 TABLET ORAL
Qty: 20 | Refills: 0
Start: 2024-12-06 | End: 2024-12-10

## 2024-12-06 RX ORDER — CEFAZOLIN SODIUM IN 0.9 % NACL 3 G/100 ML
2000 INTRAVENOUS SOLUTION, PIGGYBACK (ML) INTRAVENOUS EVERY 8 HOURS
Refills: 0 | Status: COMPLETED | OUTPATIENT
Start: 2024-12-06 | End: 2024-12-07

## 2024-12-06 RX ADMIN — SODIUM CHLORIDE 500 MILLILITER(S): 9 INJECTION, SOLUTION INTRAVENOUS at 10:40

## 2024-12-06 RX ADMIN — Medication 1000 MILLIGRAM(S): at 18:14

## 2024-12-06 RX ADMIN — SODIUM CHLORIDE 125 MILLILITER(S): 9 INJECTION, SOLUTION INTRAVENOUS at 10:40

## 2024-12-06 RX ADMIN — Medication 2000 MILLIGRAM(S): at 18:14

## 2024-12-06 RX ADMIN — OXYCODONE HYDROCHLORIDE 10 MILLIGRAM(S): 30 TABLET ORAL at 20:21

## 2024-12-06 RX ADMIN — OXYCODONE HYDROCHLORIDE 10 MILLIGRAM(S): 30 TABLET ORAL at 20:51

## 2024-12-06 RX ADMIN — Medication 400 MILLIGRAM(S): at 18:14

## 2024-12-06 RX ADMIN — SODIUM CHLORIDE 500 MILLILITER(S): 9 INJECTION, SOLUTION INTRAVENOUS at 15:19

## 2024-12-06 RX ADMIN — APREPITANT 40 MILLIGRAM(S): 40 CAPSULE ORAL at 08:08

## 2024-12-07 ENCOUNTER — TRANSCRIPTION ENCOUNTER (OUTPATIENT)
Age: 64
End: 2024-12-07

## 2024-12-07 VITALS
TEMPERATURE: 98 F | SYSTOLIC BLOOD PRESSURE: 173 MMHG | HEART RATE: 66 BPM | DIASTOLIC BLOOD PRESSURE: 73 MMHG | OXYGEN SATURATION: 97 % | RESPIRATION RATE: 16 BRPM

## 2024-12-07 LAB
ANION GAP SERPL CALC-SCNC: 2 MMOL/L — LOW (ref 5–17)
BUN SERPL-MCNC: 10 MG/DL — SIGNIFICANT CHANGE UP (ref 7–23)
CALCIUM SERPL-MCNC: 9 MG/DL — SIGNIFICANT CHANGE UP (ref 8.5–10.1)
CHLORIDE SERPL-SCNC: 113 MMOL/L — HIGH (ref 96–108)
CO2 SERPL-SCNC: 25 MMOL/L — SIGNIFICANT CHANGE UP (ref 22–31)
CREAT SERPL-MCNC: 0.64 MG/DL — SIGNIFICANT CHANGE UP (ref 0.5–1.3)
EGFR: 99 ML/MIN/1.73M2 — SIGNIFICANT CHANGE UP
EGFR: 99 ML/MIN/1.73M2 — SIGNIFICANT CHANGE UP
GLUCOSE SERPL-MCNC: 118 MG/DL — HIGH (ref 70–99)
HCT VFR BLD CALC: 34.5 % — SIGNIFICANT CHANGE UP (ref 34.5–45)
HGB BLD-MCNC: 11.7 G/DL — SIGNIFICANT CHANGE UP (ref 11.5–15.5)
MCHC RBC-ENTMCNC: 31.3 PG — SIGNIFICANT CHANGE UP (ref 27–34)
MCHC RBC-ENTMCNC: 33.9 G/DL — SIGNIFICANT CHANGE UP (ref 32–36)
MCV RBC AUTO: 92.2 FL — SIGNIFICANT CHANGE UP (ref 80–100)
PLATELET # BLD AUTO: 191 K/UL — SIGNIFICANT CHANGE UP (ref 150–400)
POTASSIUM SERPL-MCNC: 3.8 MMOL/L — SIGNIFICANT CHANGE UP (ref 3.5–5.3)
POTASSIUM SERPL-SCNC: 3.8 MMOL/L — SIGNIFICANT CHANGE UP (ref 3.5–5.3)
RBC # BLD: 3.74 M/UL — LOW (ref 3.8–5.2)
RBC # FLD: 12.8 % — SIGNIFICANT CHANGE UP (ref 10.3–14.5)
SODIUM SERPL-SCNC: 140 MMOL/L — SIGNIFICANT CHANGE UP (ref 135–145)
WBC # BLD: 11.43 K/UL — HIGH (ref 3.8–10.5)
WBC # FLD AUTO: 11.43 K/UL — HIGH (ref 3.8–10.5)

## 2024-12-07 RX ORDER — CELECOXIB 100 MG
1 CAPSULE ORAL
Refills: 0 | DISCHARGE

## 2024-12-07 RX ADMIN — OXYCODONE HYDROCHLORIDE 10 MILLIGRAM(S): 30 TABLET ORAL at 03:43

## 2024-12-07 RX ADMIN — FOLIC ACID 1 MILLIGRAM(S): 1 TABLET ORAL at 08:08

## 2024-12-07 RX ADMIN — Medication 325 MILLIGRAM(S): at 08:08

## 2024-12-07 RX ADMIN — OXYCODONE HYDROCHLORIDE 10 MILLIGRAM(S): 30 TABLET ORAL at 03:13

## 2024-12-07 RX ADMIN — Medication 400 MILLIGRAM(S): at 00:06

## 2024-12-07 RX ADMIN — Medication 0.5 MILLIGRAM(S): at 00:06

## 2024-12-07 RX ADMIN — DEXAMETHASONE 101.6 MILLIGRAM(S): 0.5 TABLET ORAL at 06:22

## 2024-12-07 RX ADMIN — Medication 1 TABLET(S): at 08:08

## 2024-12-07 RX ADMIN — Medication 1000 MILLIGRAM(S): at 00:32

## 2024-12-07 RX ADMIN — Medication 81 MILLIGRAM(S): at 06:22

## 2024-12-07 RX ADMIN — Medication 400 MILLIGRAM(S): at 08:07

## 2024-12-07 RX ADMIN — Medication 40 MILLIGRAM(S): at 06:22

## 2024-12-07 RX ADMIN — Medication 2000 MILLIGRAM(S): at 00:06

## 2024-12-07 RX ADMIN — CELECOXIB 200 MILLIGRAM(S): 50 CAPSULE ORAL at 08:07

## 2024-12-07 RX ADMIN — Medication 0.5 MILLIGRAM(S): at 00:36

## 2024-12-07 RX ADMIN — SODIUM CHLORIDE 75 MILLILITER(S): 9 INJECTION, SOLUTION INTRAVENOUS at 00:22

## 2024-12-09 ENCOUNTER — TRANSCRIPTION ENCOUNTER (OUTPATIENT)
Age: 64
End: 2024-12-09

## 2024-12-09 LAB — SURGICAL PATHOLOGY STUDY: SIGNIFICANT CHANGE UP

## 2024-12-10 RX ORDER — TRAMADOL HYDROCHLORIDE 50 MG/1
50 TABLET, COATED ORAL
Qty: 42 | Refills: 0 | Status: ACTIVE | COMMUNITY
Start: 2024-12-10 | End: 1900-01-01

## 2024-12-13 DIAGNOSIS — E78.5 HYPERLIPIDEMIA, UNSPECIFIED: ICD-10-CM

## 2024-12-13 DIAGNOSIS — D62 ACUTE POSTHEMORRHAGIC ANEMIA: ICD-10-CM

## 2024-12-13 DIAGNOSIS — G47.33 OBSTRUCTIVE SLEEP APNEA (ADULT) (PEDIATRIC): ICD-10-CM

## 2024-12-13 DIAGNOSIS — Z86.0100 PERSONAL HISTORY OF COLON POLYPS, UNSPECIFIED: ICD-10-CM

## 2024-12-13 DIAGNOSIS — Z79.899 OTHER LONG TERM (CURRENT) DRUG THERAPY: ICD-10-CM

## 2024-12-13 DIAGNOSIS — Z85.51 PERSONAL HISTORY OF MALIGNANT NEOPLASM OF BLADDER: ICD-10-CM

## 2024-12-13 DIAGNOSIS — M16.11 UNILATERAL PRIMARY OSTEOARTHRITIS, RIGHT HIP: ICD-10-CM

## 2024-12-18 ENCOUNTER — APPOINTMENT (OUTPATIENT)
Dept: NEUROLOGY | Facility: CLINIC | Age: 64
End: 2024-12-18

## 2025-01-06 ENCOUNTER — APPOINTMENT (OUTPATIENT)
Facility: CLINIC | Age: 65
End: 2025-01-06
Payer: COMMERCIAL

## 2025-01-06 VITALS — HEIGHT: 60 IN | WEIGHT: 177 LBS | BODY MASS INDEX: 34.75 KG/M2

## 2025-01-06 DIAGNOSIS — M16.11 UNILATERAL PRIMARY OSTEOARTHRITIS, RIGHT HIP: ICD-10-CM

## 2025-01-06 PROCEDURE — 99024 POSTOP FOLLOW-UP VISIT: CPT

## 2025-01-06 PROCEDURE — 73502 X-RAY EXAM HIP UNI 2-3 VIEWS: CPT

## 2025-01-10 ENCOUNTER — APPOINTMENT (OUTPATIENT)
Facility: CLINIC | Age: 65
End: 2025-01-10
Payer: COMMERCIAL

## 2025-01-10 DIAGNOSIS — M16.12 UNILATERAL PRIMARY OSTEOARTHRITIS, LEFT HIP: ICD-10-CM

## 2025-01-10 DIAGNOSIS — Z96.641 PRESENCE OF RIGHT ARTIFICIAL HIP JOINT: ICD-10-CM

## 2025-01-10 PROCEDURE — 73502 X-RAY EXAM HIP UNI 2-3 VIEWS: CPT

## 2025-01-10 PROCEDURE — 99024 POSTOP FOLLOW-UP VISIT: CPT

## 2025-01-14 ENCOUNTER — RX RENEWAL (OUTPATIENT)
Age: 65
End: 2025-01-14

## 2025-01-30 ENCOUNTER — RESULT REVIEW (OUTPATIENT)
Age: 65
End: 2025-01-30

## 2025-01-30 ENCOUNTER — OUTPATIENT (OUTPATIENT)
Dept: OUTPATIENT SERVICES | Facility: HOSPITAL | Age: 65
LOS: 1 days | End: 2025-01-30
Payer: COMMERCIAL

## 2025-01-30 ENCOUNTER — APPOINTMENT (OUTPATIENT)
Dept: ULTRASOUND IMAGING | Facility: CLINIC | Age: 65
End: 2025-01-30
Payer: COMMERCIAL

## 2025-01-30 DIAGNOSIS — Z98.890 OTHER SPECIFIED POSTPROCEDURAL STATES: Chronic | ICD-10-CM

## 2025-01-30 DIAGNOSIS — M16.12 UNILATERAL PRIMARY OSTEOARTHRITIS, LEFT HIP: ICD-10-CM

## 2025-01-30 DIAGNOSIS — Z90.89 ACQUIRED ABSENCE OF OTHER ORGANS: Chronic | ICD-10-CM

## 2025-01-30 PROCEDURE — 20611 DRAIN/INJ JOINT/BURSA W/US: CPT

## 2025-01-30 PROCEDURE — 20611 DRAIN/INJ JOINT/BURSA W/US: CPT | Mod: LT

## 2025-02-04 ENCOUNTER — APPOINTMENT (OUTPATIENT)
Dept: RHEUMATOLOGY | Facility: CLINIC | Age: 65
End: 2025-02-04
Payer: COMMERCIAL

## 2025-02-04 VITALS
TEMPERATURE: 97.6 F | HEIGHT: 60 IN | DIASTOLIC BLOOD PRESSURE: 78 MMHG | OXYGEN SATURATION: 96 % | WEIGHT: 175 LBS | SYSTOLIC BLOOD PRESSURE: 118 MMHG | HEART RATE: 76 BPM | BODY MASS INDEX: 34.36 KG/M2

## 2025-02-04 DIAGNOSIS — M25.50 PAIN IN UNSPECIFIED JOINT: ICD-10-CM

## 2025-02-04 DIAGNOSIS — M19.90 UNSPECIFIED OSTEOARTHRITIS, UNSPECIFIED SITE: ICD-10-CM

## 2025-02-04 DIAGNOSIS — M25.60 STIFFNESS OF UNSPECIFIED JOINT, NOT ELSEWHERE CLASSIFIED: ICD-10-CM

## 2025-02-04 PROCEDURE — 36415 COLL VENOUS BLD VENIPUNCTURE: CPT

## 2025-02-04 PROCEDURE — 99204 OFFICE O/P NEW MOD 45 MIN: CPT

## 2025-02-05 LAB
ALBUMIN SERPL ELPH-MCNC: 4.2 G/DL
ALP BLD-CCNC: 53 U/L
ALT SERPL-CCNC: 26 U/L
ANION GAP SERPL CALC-SCNC: 11 MMOL/L
AST SERPL-CCNC: 15 U/L
BILIRUB SERPL-MCNC: 0.5 MG/DL
BUN SERPL-MCNC: 17 MG/DL
CALCIUM SERPL-MCNC: 9.5 MG/DL
CCP AB SER IA-ACNC: <8 U/ML
CHLORIDE SERPL-SCNC: 103 MMOL/L
CO2 SERPL-SCNC: 24 MMOL/L
CREAT SERPL-MCNC: 0.71 MG/DL
CRP SERPL-MCNC: <3 MG/L
EGFR: 95 ML/MIN/1.73M2
ERYTHROCYTE [SEDIMENTATION RATE] IN BLOOD BY WESTERGREN METHOD: 15 MM/HR
GLUCOSE SERPL-MCNC: 86 MG/DL
HAV IGM SER QL: NONREACTIVE
HBV CORE IGG+IGM SER QL: NONREACTIVE
HBV CORE IGM SER QL: NONREACTIVE
HBV SURFACE AG SER QL: NONREACTIVE
HCT VFR BLD CALC: 39.8 %
HCV AB SER QL: NONREACTIVE
HCV S/CO RATIO: 0.11 S/CO
HGB BLD-MCNC: 13.1 G/DL
MCHC RBC-ENTMCNC: 30 PG
MCHC RBC-ENTMCNC: 32.9 G/DL
MCV RBC AUTO: 91.3 FL
PLATELET # BLD AUTO: 253 K/UL
POTASSIUM SERPL-SCNC: 4.2 MMOL/L
PROT SERPL-MCNC: 6.4 G/DL
RBC # BLD: 4.36 M/UL
RBC # FLD: 13 %
RF+CCP IGG SER-IMP: NEGATIVE
RHEUMATOID FACT SER QL: <10 IU/ML
SODIUM SERPL-SCNC: 139 MMOL/L
WBC # FLD AUTO: 9.12 K/UL

## 2025-02-09 LAB
M TB IFN-G BLD-IMP: NEGATIVE
QUANTIFERON TB PLUS MITOGEN MINUS NIL: >10 IU/ML
QUANTIFERON TB PLUS NIL: 0.02 IU/ML
QUANTIFERON TB PLUS TB1 MINUS NIL: 0.01 IU/ML
QUANTIFERON TB PLUS TB2 MINUS NIL: 0.01 IU/ML

## 2025-03-03 ENCOUNTER — APPOINTMENT (OUTPATIENT)
Facility: CLINIC | Age: 65
End: 2025-03-03
Payer: COMMERCIAL

## 2025-03-03 VITALS — WEIGHT: 175 LBS | HEIGHT: 60 IN | BODY MASS INDEX: 34.36 KG/M2

## 2025-03-03 DIAGNOSIS — M16.11 UNILATERAL PRIMARY OSTEOARTHRITIS, RIGHT HIP: ICD-10-CM

## 2025-03-03 DIAGNOSIS — Z96.641 PRESENCE OF RIGHT ARTIFICIAL HIP JOINT: ICD-10-CM

## 2025-03-03 PROCEDURE — 99024 POSTOP FOLLOW-UP VISIT: CPT

## 2025-03-20 ENCOUNTER — APPOINTMENT (OUTPATIENT)
Dept: DERMATOLOGY | Facility: CLINIC | Age: 65
End: 2025-03-20
Payer: COMMERCIAL

## 2025-03-20 DIAGNOSIS — L81.4 OTHER MELANIN HYPERPIGMENTATION: ICD-10-CM

## 2025-03-20 DIAGNOSIS — L82.1 OTHER SEBORRHEIC KERATOSIS: ICD-10-CM

## 2025-03-20 PROCEDURE — 99213 OFFICE O/P EST LOW 20 MIN: CPT

## 2025-03-20 RX ORDER — TRAMADOL HYDROCHLORIDE 50 MG/1
50 TABLET, COATED ORAL
Qty: 42 | Refills: 0 | Status: ACTIVE | COMMUNITY
Start: 2025-03-20 | End: 1900-01-01

## 2025-04-10 ENCOUNTER — NON-APPOINTMENT (OUTPATIENT)
Age: 65
End: 2025-04-10

## 2025-04-10 ENCOUNTER — APPOINTMENT (OUTPATIENT)
Dept: DERMATOLOGY | Facility: CLINIC | Age: 65
End: 2025-04-10
Payer: COMMERCIAL

## 2025-04-10 DIAGNOSIS — L82.1 OTHER SEBORRHEIC KERATOSIS: ICD-10-CM

## 2025-04-10 DIAGNOSIS — L81.4 OTHER MELANIN HYPERPIGMENTATION: ICD-10-CM

## 2025-04-10 DIAGNOSIS — D22.9 MELANOCYTIC NEVI, UNSPECIFIED: ICD-10-CM

## 2025-04-10 DIAGNOSIS — L71.9 ROSACEA, UNSPECIFIED: ICD-10-CM

## 2025-04-10 DIAGNOSIS — Z12.83 ENCOUNTER FOR SCREENING FOR MALIGNANT NEOPLASM OF SKIN: ICD-10-CM

## 2025-04-10 PROCEDURE — 99214 OFFICE O/P EST MOD 30 MIN: CPT

## 2025-05-01 RX ORDER — CELECOXIB 200 MG/1
200 CAPSULE ORAL
Qty: 60 | Refills: 2 | Status: ACTIVE | COMMUNITY
Start: 2025-05-01 | End: 1900-01-01

## 2025-05-12 ENCOUNTER — APPOINTMENT (OUTPATIENT)
Dept: FAMILY MEDICINE | Facility: CLINIC | Age: 65
End: 2025-05-12
Payer: COMMERCIAL

## 2025-05-12 VITALS
DIASTOLIC BLOOD PRESSURE: 80 MMHG | TEMPERATURE: 97.6 F | OXYGEN SATURATION: 96 % | HEIGHT: 60 IN | WEIGHT: 175 LBS | SYSTOLIC BLOOD PRESSURE: 150 MMHG | BODY MASS INDEX: 34.36 KG/M2 | HEART RATE: 101 BPM

## 2025-05-12 VITALS — HEART RATE: 81 BPM | SYSTOLIC BLOOD PRESSURE: 140 MMHG | OXYGEN SATURATION: 97 % | DIASTOLIC BLOOD PRESSURE: 80 MMHG

## 2025-05-12 DIAGNOSIS — F41.8 OTHER SPECIFIED ANXIETY DISORDERS: ICD-10-CM

## 2025-05-12 DIAGNOSIS — E78.00 PURE HYPERCHOLESTEROLEMIA, UNSPECIFIED: ICD-10-CM

## 2025-05-12 DIAGNOSIS — R73.01 IMPAIRED FASTING GLUCOSE: ICD-10-CM

## 2025-05-12 DIAGNOSIS — I10 ESSENTIAL (PRIMARY) HYPERTENSION: ICD-10-CM

## 2025-05-12 PROCEDURE — 36415 COLL VENOUS BLD VENIPUNCTURE: CPT

## 2025-05-12 PROCEDURE — G2211 COMPLEX E/M VISIT ADD ON: CPT

## 2025-05-12 PROCEDURE — 99214 OFFICE O/P EST MOD 30 MIN: CPT

## 2025-05-14 LAB
ALBUMIN SERPL ELPH-MCNC: 4.1 G/DL
ALP BLD-CCNC: 57 U/L
ALT SERPL-CCNC: 24 U/L
ANION GAP SERPL CALC-SCNC: 13 MMOL/L
AST SERPL-CCNC: 25 U/L
BILIRUB SERPL-MCNC: 0.5 MG/DL
BUN SERPL-MCNC: 13 MG/DL
CALCIUM SERPL-MCNC: 9.4 MG/DL
CHLORIDE SERPL-SCNC: 108 MMOL/L
CHOLEST SERPL-MCNC: 169 MG/DL
CO2 SERPL-SCNC: 25 MMOL/L
CREAT SERPL-MCNC: 0.71 MG/DL
EGFRCR SERPLBLD CKD-EPI 2021: 95 ML/MIN/1.73M2
ESTIMATED AVERAGE GLUCOSE: 111 MG/DL
GLUCOSE SERPL-MCNC: 98 MG/DL
HBA1C MFR BLD HPLC: 5.5 %
HDLC SERPL-MCNC: 55 MG/DL
LDLC SERPL-MCNC: 98 MG/DL
NONHDLC SERPL-MCNC: 114 MG/DL
POTASSIUM SERPL-SCNC: 4.7 MMOL/L
PROT SERPL-MCNC: 6.3 G/DL
SODIUM SERPL-SCNC: 146 MMOL/L
TRIGL SERPL-MCNC: 85 MG/DL

## 2025-05-19 RX ORDER — CELECOXIB 200 MG/1
200 CAPSULE ORAL
Qty: 60 | Refills: 2 | Status: ACTIVE | COMMUNITY
Start: 2025-05-19 | End: 1900-01-01

## 2025-05-20 RX ORDER — CELECOXIB 100 MG/1
100 CAPSULE ORAL TWICE DAILY
Qty: 60 | Refills: 0 | Status: ACTIVE | COMMUNITY
Start: 2025-05-20 | End: 1900-01-01

## 2025-06-25 ENCOUNTER — APPOINTMENT (OUTPATIENT)
Facility: CLINIC | Age: 65
End: 2025-06-25

## 2025-07-23 ENCOUNTER — APPOINTMENT (OUTPATIENT)
Facility: CLINIC | Age: 65
End: 2025-07-23
Payer: COMMERCIAL

## 2025-07-23 VITALS — WEIGHT: 175 LBS | BODY MASS INDEX: 34.36 KG/M2 | HEIGHT: 60 IN

## 2025-07-23 DIAGNOSIS — S83.242A OTHER TEAR OF MEDIAL MENISCUS, CURRENT INJURY, LEFT KNEE, INITIAL ENCOUNTER: ICD-10-CM

## 2025-07-23 DIAGNOSIS — M16.12 UNILATERAL PRIMARY OSTEOARTHRITIS, LEFT HIP: ICD-10-CM

## 2025-07-23 PROCEDURE — 73503 X-RAY EXAM HIP UNI 4/> VIEWS: CPT | Mod: LT

## 2025-07-23 PROCEDURE — 73564 X-RAY EXAM KNEE 4 OR MORE: CPT | Mod: LT

## 2025-07-23 PROCEDURE — J3490M: CUSTOM

## 2025-07-23 PROCEDURE — 99213 OFFICE O/P EST LOW 20 MIN: CPT | Mod: 25

## 2025-07-23 PROCEDURE — 20610 DRAIN/INJ JOINT/BURSA W/O US: CPT | Mod: LT

## 2025-07-23 RX ORDER — CELECOXIB 100 MG/1
100 CAPSULE ORAL DAILY
Qty: 30 | Refills: 0 | Status: ACTIVE | COMMUNITY
Start: 2025-07-23 | End: 1900-01-01

## 2025-07-24 RX ORDER — CELECOXIB 100 MG/1
100 CAPSULE ORAL TWICE DAILY
Qty: 60 | Refills: 0 | Status: ACTIVE | COMMUNITY
Start: 2025-07-24 | End: 1900-01-01

## 2025-07-24 RX ORDER — TRAMADOL HYDROCHLORIDE 50 MG/1
50 TABLET, COATED ORAL
Qty: 42 | Refills: 0 | Status: ACTIVE | COMMUNITY
Start: 2025-07-24 | End: 1900-01-01

## 2025-08-18 ENCOUNTER — OUTPATIENT (OUTPATIENT)
Dept: OUTPATIENT SERVICES | Facility: HOSPITAL | Age: 65
LOS: 1 days | End: 2025-08-18
Payer: COMMERCIAL

## 2025-08-18 ENCOUNTER — APPOINTMENT (OUTPATIENT)
Dept: ULTRASOUND IMAGING | Facility: CLINIC | Age: 65
End: 2025-08-18
Payer: COMMERCIAL

## 2025-08-18 ENCOUNTER — APPOINTMENT (OUTPATIENT)
Dept: RADIOLOGY | Facility: CLINIC | Age: 65
End: 2025-08-18
Payer: COMMERCIAL

## 2025-08-18 ENCOUNTER — APPOINTMENT (OUTPATIENT)
Dept: MAMMOGRAPHY | Facility: CLINIC | Age: 65
End: 2025-08-18
Payer: COMMERCIAL

## 2025-08-18 DIAGNOSIS — Z98.890 OTHER SPECIFIED POSTPROCEDURAL STATES: Chronic | ICD-10-CM

## 2025-08-18 DIAGNOSIS — Z90.89 ACQUIRED ABSENCE OF OTHER ORGANS: Chronic | ICD-10-CM

## 2025-08-18 DIAGNOSIS — Z00.8 ENCOUNTER FOR OTHER GENERAL EXAMINATION: ICD-10-CM

## 2025-08-18 PROCEDURE — 77067 SCR MAMMO BI INCL CAD: CPT | Mod: 26

## 2025-08-18 PROCEDURE — 77063 BREAST TOMOSYNTHESIS BI: CPT | Mod: 26

## 2025-08-18 PROCEDURE — 77063 BREAST TOMOSYNTHESIS BI: CPT

## 2025-08-18 PROCEDURE — 77080 DXA BONE DENSITY AXIAL: CPT

## 2025-08-18 PROCEDURE — 76641 ULTRASOUND BREAST COMPLETE: CPT

## 2025-08-18 PROCEDURE — 77080 DXA BONE DENSITY AXIAL: CPT | Mod: 26

## 2025-08-18 PROCEDURE — 76641 ULTRASOUND BREAST COMPLETE: CPT | Mod: 26,50

## 2025-08-18 PROCEDURE — 77067 SCR MAMMO BI INCL CAD: CPT

## 2025-08-22 RX ORDER — CELECOXIB 100 MG/1
100 CAPSULE ORAL TWICE DAILY
Qty: 60 | Refills: 0 | Status: ACTIVE | COMMUNITY
Start: 2025-08-22 | End: 1900-01-01